# Patient Record
Sex: FEMALE | Race: WHITE | NOT HISPANIC OR LATINO | Employment: OTHER | URBAN - METROPOLITAN AREA
[De-identification: names, ages, dates, MRNs, and addresses within clinical notes are randomized per-mention and may not be internally consistent; named-entity substitution may affect disease eponyms.]

---

## 2017-05-09 ENCOUNTER — TRANSCRIBE ORDERS (OUTPATIENT)
Dept: ADMINISTRATIVE | Facility: HOSPITAL | Age: 67
End: 2017-05-09

## 2017-05-09 ENCOUNTER — APPOINTMENT (OUTPATIENT)
Dept: LAB | Facility: CLINIC | Age: 67
End: 2017-05-09
Payer: MEDICARE

## 2017-05-09 DIAGNOSIS — E03.9 UNSPECIFIED HYPOTHYROIDISM: Primary | ICD-10-CM

## 2017-05-09 DIAGNOSIS — E03.9 UNSPECIFIED HYPOTHYROIDISM: ICD-10-CM

## 2017-05-09 DIAGNOSIS — E06.3 CHRONIC LYMPHOCYTIC THYROIDITIS: ICD-10-CM

## 2017-05-09 LAB
T4 FREE SERPL-MCNC: 1.39 NG/DL (ref 0.76–1.46)
TSH SERPL DL<=0.05 MIU/L-ACNC: 0.45 UIU/ML (ref 0.36–3.74)

## 2017-05-09 PROCEDURE — 84439 ASSAY OF FREE THYROXINE: CPT

## 2017-05-09 PROCEDURE — 36415 COLL VENOUS BLD VENIPUNCTURE: CPT

## 2017-05-09 PROCEDURE — 84443 ASSAY THYROID STIM HORMONE: CPT

## 2017-05-26 ENCOUNTER — HOSPITAL ENCOUNTER (OUTPATIENT)
Dept: MAMMOGRAPHY | Facility: CLINIC | Age: 67
Discharge: HOME | End: 2017-05-26
Payer: MEDICARE

## 2017-05-26 DIAGNOSIS — Z12.31 ENCOUNTER FOR SCREENING MAMMOGRAM FOR MALIGNANT NEOPLASM OF BREAST: ICD-10-CM

## 2017-05-26 PROCEDURE — G0202 SCR MAMMO BI INCL CAD: HCPCS

## 2017-09-11 ENCOUNTER — TRANSCRIBE ORDERS (OUTPATIENT)
Dept: ADMINISTRATIVE | Facility: HOSPITAL | Age: 67
End: 2017-09-11

## 2017-09-11 ENCOUNTER — APPOINTMENT (OUTPATIENT)
Dept: LAB | Facility: CLINIC | Age: 67
End: 2017-09-11
Payer: MEDICARE

## 2017-09-11 DIAGNOSIS — L03.90 CELLULITIS OF SKIN WITH LYMPHANGITIS: ICD-10-CM

## 2017-09-11 DIAGNOSIS — L30.9 ACUTE DERMATITIS: Primary | ICD-10-CM

## 2017-09-11 DIAGNOSIS — L30.9 ACUTE DERMATITIS: ICD-10-CM

## 2017-09-11 LAB
BASOPHILS # BLD AUTO: 0.09 THOUSANDS/ΜL (ref 0–0.1)
BASOPHILS NFR BLD AUTO: 2 % (ref 0–1)
EOSINOPHIL # BLD AUTO: 0.27 THOUSAND/ΜL (ref 0–0.61)
EOSINOPHIL NFR BLD AUTO: 4 % (ref 0–6)
ERYTHROCYTE [DISTWIDTH] IN BLOOD BY AUTOMATED COUNT: 13.1 % (ref 11.6–15.1)
HCT VFR BLD AUTO: 35.4 % (ref 34.8–46.1)
HGB BLD-MCNC: 11.6 G/DL (ref 11.5–15.4)
LYMPHOCYTES # BLD AUTO: 1.87 THOUSANDS/ΜL (ref 0.6–4.47)
LYMPHOCYTES NFR BLD AUTO: 30 % (ref 14–44)
MCH RBC QN AUTO: 31.6 PG (ref 26.8–34.3)
MCHC RBC AUTO-ENTMCNC: 32.8 G/DL (ref 31.4–37.4)
MCV RBC AUTO: 97 FL (ref 82–98)
MONOCYTES # BLD AUTO: 0.63 THOUSAND/ΜL (ref 0.17–1.22)
MONOCYTES NFR BLD AUTO: 10 % (ref 4–12)
NEUTROPHILS # BLD AUTO: 3.31 THOUSANDS/ΜL (ref 1.85–7.62)
NEUTS SEG NFR BLD AUTO: 54 % (ref 43–75)
NRBC BLD AUTO-RTO: 0 /100 WBCS
PLATELET # BLD AUTO: 255 THOUSANDS/UL (ref 149–390)
PMV BLD AUTO: 10.6 FL (ref 8.9–12.7)
RBC # BLD AUTO: 3.67 MILLION/UL (ref 3.81–5.12)
WBC # BLD AUTO: 6.18 THOUSAND/UL (ref 4.31–10.16)

## 2017-09-11 PROCEDURE — 36415 COLL VENOUS BLD VENIPUNCTURE: CPT

## 2017-09-11 PROCEDURE — 86618 LYME DISEASE ANTIBODY: CPT

## 2017-09-11 PROCEDURE — 85025 COMPLETE CBC W/AUTO DIFF WBC: CPT

## 2017-09-13 LAB
B BURGDOR IGG SER IA-ACNC: 0.09
B BURGDOR IGM SER IA-ACNC: 0.15

## 2017-12-12 ENCOUNTER — TRANSCRIBE ORDERS (OUTPATIENT)
Dept: ADMINISTRATIVE | Facility: HOSPITAL | Age: 67
End: 2017-12-12

## 2017-12-12 ENCOUNTER — APPOINTMENT (OUTPATIENT)
Dept: LAB | Facility: CLINIC | Age: 67
End: 2017-12-12
Payer: MEDICARE

## 2017-12-12 DIAGNOSIS — Z00.00 ROUTINE GENERAL MEDICAL EXAMINATION AT A HEALTH CARE FACILITY: Primary | ICD-10-CM

## 2017-12-12 DIAGNOSIS — Z11.59 NEED FOR HEPATITIS C SCREENING TEST: ICD-10-CM

## 2017-12-12 DIAGNOSIS — Z00.00 ROUTINE GENERAL MEDICAL EXAMINATION AT A HEALTH CARE FACILITY: ICD-10-CM

## 2017-12-12 DIAGNOSIS — E78.5 HYPERLIPIDEMIA, UNSPECIFIED HYPERLIPIDEMIA TYPE: ICD-10-CM

## 2017-12-12 LAB
ALBUMIN SERPL BCP-MCNC: 3.5 G/DL (ref 3.5–5)
ALP SERPL-CCNC: 72 U/L (ref 46–116)
ALT SERPL W P-5'-P-CCNC: 19 U/L (ref 12–78)
ANION GAP SERPL CALCULATED.3IONS-SCNC: 5 MMOL/L (ref 4–13)
AST SERPL W P-5'-P-CCNC: 16 U/L (ref 5–45)
BILIRUB SERPL-MCNC: 0.87 MG/DL (ref 0.2–1)
BUN SERPL-MCNC: 19 MG/DL (ref 5–25)
CALCIUM SERPL-MCNC: 8.8 MG/DL (ref 8.3–10.1)
CHLORIDE SERPL-SCNC: 106 MMOL/L (ref 100–108)
CHOLEST SERPL-MCNC: 184 MG/DL (ref 50–200)
CO2 SERPL-SCNC: 27 MMOL/L (ref 21–32)
CREAT SERPL-MCNC: 0.91 MG/DL (ref 0.6–1.3)
GFR SERPL CREATININE-BSD FRML MDRD: 65 ML/MIN/1.73SQ M
GLUCOSE P FAST SERPL-MCNC: 86 MG/DL (ref 65–99)
HCV AB SER QL: NORMAL
HDLC SERPL-MCNC: 72 MG/DL (ref 40–60)
LDLC SERPL CALC-MCNC: 100 MG/DL (ref 0–100)
POTASSIUM SERPL-SCNC: 4.5 MMOL/L (ref 3.5–5.3)
PROT SERPL-MCNC: 7.8 G/DL (ref 6.4–8.2)
SODIUM SERPL-SCNC: 138 MMOL/L (ref 136–145)
TRIGL SERPL-MCNC: 62 MG/DL

## 2017-12-12 PROCEDURE — 36415 COLL VENOUS BLD VENIPUNCTURE: CPT

## 2017-12-12 PROCEDURE — 80053 COMPREHEN METABOLIC PANEL: CPT

## 2017-12-12 PROCEDURE — 86803 HEPATITIS C AB TEST: CPT

## 2017-12-12 PROCEDURE — 80061 LIPID PANEL: CPT

## 2018-01-15 NOTE — MISCELLANEOUS
Message   Recorded as Task   Date: 09/16/2016 02:31 PM, Created By: Peggy Swann   Task Name: Go to Result   Assigned To: Chang Hernández   Regarding Patient: Jay Jay Cooney, Status: Active   Comment:    Peggy Swann - 16 Sep 2016 2:31 PM     TASK CREATED  nml dexa        Active Problems    1  Encounter for screening mammogram for malignant neoplasm of breast (V76 12)   (Z12 31)   2  History of self breast exam   3  Menopausal state (627 2) (N95 1)   4  Visit for routine gyn exam (V72 31) (Z01 419)    Current Meds   1  Levothyroxine Sodium 100 MCG Oral Tablet; Therapy: 21Jun2016 to Recorded   2  Lisinopril 10 MG Oral Tablet; Therapy: [id number] to Recorded   3  Simvastatin 20 MG Oral Tablet; Therapy: 39SPU1055 to (Last Rx:04Apr2011)  Requested for: 04Apr2011 Ordered    Allergies    1  Erythromycin Base TABS   2   Penicillins    Signatures   Electronically signed by : Moraima Magallanes, ; Sep 19 2016  8:52AM EST                       (Author)

## 2018-04-26 DIAGNOSIS — E06.3 HYPOTHYROIDISM DUE TO HASHIMOTO'S THYROIDITIS: Primary | ICD-10-CM

## 2018-04-26 DIAGNOSIS — E03.8 HYPOTHYROIDISM DUE TO HASHIMOTO'S THYROIDITIS: Primary | ICD-10-CM

## 2018-04-26 RX ORDER — LEVOTHYROXINE SODIUM 0.1 MG/1
100 TABLET ORAL DAILY
Refills: 11 | COMMUNITY
Start: 2018-03-26 | End: 2018-04-26 | Stop reason: SDUPTHER

## 2018-04-26 RX ORDER — LEVOTHYROXINE SODIUM 0.1 MG/1
100 TABLET ORAL DAILY
Qty: 30 TABLET | Refills: 3 | Status: SHIPPED | OUTPATIENT
Start: 2018-04-26 | End: 2018-08-22 | Stop reason: DRUGHIGH

## 2018-05-17 ENCOUNTER — TRANSCRIBE ORDERS (OUTPATIENT)
Dept: ADMINISTRATIVE | Facility: HOSPITAL | Age: 68
End: 2018-05-17

## 2018-05-17 DIAGNOSIS — Z13.820 SCREENING FOR OSTEOPOROSIS: Primary | ICD-10-CM

## 2018-05-17 DIAGNOSIS — Z12.31 ENCOUNTER FOR SCREENING MAMMOGRAM FOR MALIGNANT NEOPLASM OF BREAST: Primary | ICD-10-CM

## 2018-05-29 ENCOUNTER — HOSPITAL ENCOUNTER (OUTPATIENT)
Dept: MAMMOGRAPHY | Facility: CLINIC | Age: 68
Discharge: HOME | End: 2018-05-29
Payer: MEDICARE

## 2018-05-29 DIAGNOSIS — Z12.31 ENCOUNTER FOR SCREENING MAMMOGRAM FOR MALIGNANT NEOPLASM OF BREAST: ICD-10-CM

## 2018-05-29 PROCEDURE — 77067 SCR MAMMO BI INCL CAD: CPT

## 2018-08-15 ENCOUNTER — APPOINTMENT (OUTPATIENT)
Dept: LAB | Facility: CLINIC | Age: 68
End: 2018-08-15
Payer: MEDICARE

## 2018-08-15 DIAGNOSIS — E06.3 HYPOTHYROIDISM DUE TO HASHIMOTO'S THYROIDITIS: ICD-10-CM

## 2018-08-15 DIAGNOSIS — E03.8 HYPOTHYROIDISM DUE TO HASHIMOTO'S THYROIDITIS: ICD-10-CM

## 2018-08-15 LAB
T4 FREE SERPL-MCNC: 1.52 NG/DL (ref 0.76–1.46)
TSH SERPL DL<=0.05 MIU/L-ACNC: 0.18 UIU/ML (ref 0.36–3.74)

## 2018-08-15 PROCEDURE — 36415 COLL VENOUS BLD VENIPUNCTURE: CPT

## 2018-08-15 PROCEDURE — 84443 ASSAY THYROID STIM HORMONE: CPT

## 2018-08-15 PROCEDURE — 84439 ASSAY OF FREE THYROXINE: CPT

## 2018-08-22 ENCOUNTER — OFFICE VISIT (OUTPATIENT)
Dept: ENDOCRINOLOGY | Facility: HOSPITAL | Age: 68
End: 2018-08-22
Payer: MEDICARE

## 2018-08-22 VITALS
HEART RATE: 70 BPM | WEIGHT: 195.8 LBS | SYSTOLIC BLOOD PRESSURE: 134 MMHG | DIASTOLIC BLOOD PRESSURE: 82 MMHG | HEIGHT: 66 IN | BODY MASS INDEX: 31.47 KG/M2

## 2018-08-22 DIAGNOSIS — E03.8 HYPOTHYROIDISM DUE TO HASHIMOTO'S THYROIDITIS: Primary | ICD-10-CM

## 2018-08-22 DIAGNOSIS — E06.3 HYPOTHYROIDISM DUE TO HASHIMOTO'S THYROIDITIS: Primary | ICD-10-CM

## 2018-08-22 PROBLEM — E03.9 HYPOTHYROID: Status: ACTIVE | Noted: 2018-08-22

## 2018-08-22 PROCEDURE — 99213 OFFICE O/P EST LOW 20 MIN: CPT | Performed by: INTERNAL MEDICINE

## 2018-08-22 RX ORDER — ALBUTEROL SULFATE 90 UG/1
AEROSOL, METERED RESPIRATORY (INHALATION)
COMMUNITY

## 2018-08-22 RX ORDER — MECLIZINE HCL 12.5 MG/1
TABLET ORAL
COMMUNITY
Start: 2018-03-26

## 2018-08-22 RX ORDER — NYSTATIN 100000 U/G
CREAM TOPICAL EVERY 12 HOURS
COMMUNITY
Start: 2017-11-14 | End: 2019-03-01

## 2018-08-22 RX ORDER — SIMVASTATIN 20 MG
20 TABLET ORAL
COMMUNITY

## 2018-08-22 RX ORDER — LISINOPRIL 10 MG/1
10 TABLET ORAL DAILY
COMMUNITY
Start: 2018-08-19 | End: 2020-08-24 | Stop reason: SINTOL

## 2018-08-22 RX ORDER — LEVOTHYROXINE SODIUM 88 UG/1
88 TABLET ORAL DAILY
Qty: 30 TABLET | Refills: 11 | Status: SHIPPED | OUTPATIENT
Start: 2018-08-22 | End: 2019-08-13 | Stop reason: SDUPTHER

## 2018-08-22 NOTE — PATIENT INSTRUCTIONS
Thyroid levels a little overactive now  Let's decrease the levothyroxine to 88 mcg daily  Recheck blood work in 3 months  Call 1 week afterwards for results  Follow up in 1 year with blood work

## 2018-08-22 NOTE — PROGRESS NOTES
8/22/2018    Assessment/Plan      Diagnoses and all orders for this visit:    Hypothyroidism due to Hashimoto's thyroiditis  -     TSH, 3rd generation Lab Collect; Future  -     T4, free Lab Collect; Future  -     TSH, 3rd generation Lab Collect; Future  -     T4, free Lab Collect; Future  -     levothyroxine 88 mcg tablet; Take 1 tablet (88 mcg total) by mouth daily    Other orders  -     lisinopril (ZESTRIL) 10 mg tablet; Take 10 mg by mouth daily    -     meclizine (ANTIVERT) 12 5 MG tablet; 1 tablet as needed  -     nystatin (MYCOSTATIN) cream; Every 12 hours  -     albuterol (PROAIR HFA) 90 mcg/act inhaler; 2 puffs as needed  -     simvastatin (ZOCOR) 20 mg tablet; Take 20 mg by mouth daily at bedtime          Assessment/Plan:  Hypothyroidism due to Hashimoto's thyroiditis  Most recent thyroid blood work does show a slightly low TSH and looking at her review over the last several years it has been decreasing slowly  Her free T4 is elevated also  This signifies biochemical hyperthyroidism  I will have her decrease her levothyroxine to 88 mcg daily  I have asked her to repeat her TSH and free T4 in 3 months and call within a week afterwards for the results so that I can adjust her medication  I have asked her to follow up in 1 year with preceding TSH and free T4       CC: thyroid consult    History of Present Illness     HPI: Noah Calhoun is a 77y o  year old female with history of hypothyroidism due to Hashimoto's thyroiditis  She was diagnosed with hypothyroidism over 20 years ago  She is currently taking levothyroxine 100 mcg daily  She denies heat or cold intolerance, tremors, insomnia, anxiety, depression, diarrhea, constipation, or weight changes  She will have a rare palpitation event  She will get tired at times but admits she has not been as active since she retired  She admits to some dry skin and hair loss, but no brittle nails  She has no diplopia    She has no compressive thyroid symptoms or dysphagia  Review of Systems   Constitutional: Positive for fatigue  Negative for unexpected weight change  Can be tired at times, less active since retired  HENT: Negative for hearing loss, tinnitus and trouble swallowing  Eyes: Negative for visual disturbance  No diplopia  Wears glasses that are bifocals  Respiratory: Negative for chest tightness and shortness of breath  Cardiovascular: Positive for palpitations  Negative for chest pain and leg swelling  Had a few palpitations when missed thyroid doses on 1 weekend  Gastrointestinal: Negative for abdominal pain, constipation, diarrhea and nausea  Endocrine: Negative for cold intolerance and heat intolerance  Musculoskeletal: Positive for arthralgias  Negative for back pain  Skin: Negative for rash  Has dry skin and some hair loss, but no brittle nails  Neurological: Negative for dizziness, tremors, light-headedness, numbness and headaches  History of vertigo in the past at times  Psychiatric/Behavioral: Negative for dysphoric mood and sleep disturbance  The patient is not nervous/anxious  Occasional anxiety  Occasional bad nights         Historical Information   Past Medical History:   Diagnosis Date    Asthma     [facility]     Breast cyst     Gout     Hyperlipidemia     Hypertension     Osteoarthritis     [facility]     Vertigo     at times     Past Surgical History:   Procedure Laterality Date    CARDIAC ELECTROPHYSIOLOGY STUDY AND ABLATION      CYST REMOVAL Bilateral     aspiration    HYSTERECTOMY      REPLACEMENT TOTAL KNEE BILATERAL Bilateral     TONSILLECTOMY AND ADENOIDECTOMY       Social History   History   Alcohol Use No     History   Drug Use No     History   Smoking Status    Never Smoker   Smokeless Tobacco    Never Used     Family History:   Family History   Problem Relation Age of Onset    Alzheimer's disease Mother  Hypertension Father     Heart attack Father     Liver disease Brother         on O2    No Known Problems Daughter     Obesity Son         post gastric bypass       Meds/Allergies   Current Outpatient Prescriptions   Medication Sig Dispense Refill    albuterol (PROAIR HFA) 90 mcg/act inhaler 2 puffs as needed      lisinopril (ZESTRIL) 10 mg tablet Take 10 mg by mouth daily        simvastatin (ZOCOR) 20 mg tablet Take 20 mg by mouth daily at bedtime        levothyroxine 88 mcg tablet Take 1 tablet (88 mcg total) by mouth daily 30 tablet 11    meclizine (ANTIVERT) 12 5 MG tablet 1 tablet as needed      nystatin (MYCOSTATIN) cream Every 12 hours       No current facility-administered medications for this visit  Allergies   Allergen Reactions    Penicillin G      Other reaction(s): anaphylaxis    Erythromycin      Other reaction(s): n/v       Objective   Vitals: Blood pressure 134/82, pulse 70, height 5' 6" (1 676 m), weight 88 8 kg (195 lb 12 8 oz)  Invasive Devices          No matching active lines, drains, or airways          Physical Exam   Constitutional: She is oriented to person, place, and time  She appears well-developed and well-nourished  HENT:   Head: Normocephalic and atraumatic  Eyes: Conjunctivae and EOM are normal  Pupils are equal, round, and reactive to light  No lid lag, stare, proptosis, or periorbital edema  Neck: Normal range of motion  Neck supple  No thyromegaly present  No carotid bruits  Cardiovascular: Normal rate, regular rhythm, normal heart sounds and intact distal pulses  No murmur heard  Pulmonary/Chest: Effort normal and breath sounds normal  She has no wheezes  Abdominal: Soft  Bowel sounds are normal  There is no tenderness  Musculoskeletal: Normal range of motion  She exhibits no edema or deformity  No tremor of the outstretched hands  No spinous process tenderness  No CVA tenderness  Lymphadenopathy:     She has no cervical adenopathy  Neurological: She is alert and oriented to person, place, and time  She has normal reflexes  Skin: Skin is warm and dry  No rash noted  Vitals reviewed  The history was obtained from the review of the chart and from the patient  Lab Results:    Most recent blood workdone on 08/15/2018 showed a TSH of 0 183 with a free T4 of 1 52        Future Appointments  Date Time Provider Robson Mcintyre   9/17/2018 7:30 AM QU MAMMO UP 1 QU UP Mammo QU UPPER PER   9/24/2018 9:15 AM DO HIRAM Roque-W[facility]   8/22/2019 8:00 AM Lina Parker MD ENDO [facility]

## 2018-08-22 NOTE — LETTER
August 22, 2018     Shayla Felipe, [address] [facility]  [address] [address]    Patient: Katie Jean   YOB: 1950   Date of Visit: 8/22/2018       Dear Dr Jesse Simms: Thank you for referring Jayro Mehta to me for evaluation  Below are my notes for this consultation  If you have questions, please do not hesitate to call me  I look forward to following your patient along with you  Sincerely,        Pat Baxter MD        CC: No Recipients  Pat Baxter MD  8/22/2018 11:27 AM  Sign at close encounter  8/22/2018    Assessment/Plan      Diagnoses and all orders for this visit:    Hypothyroidism due to Hashimoto's thyroiditis  -     TSH, 3rd generation Lab Collect; Future  -     T4, free Lab Collect; Future  -     TSH, 3rd generation Lab Collect; Future  -     T4, free Lab Collect; Future  -     levothyroxine 88 mcg tablet; Take 1 tablet (88 mcg total) by mouth daily    Other orders  -     lisinopril (ZESTRIL) 10 mg tablet; Take 10 mg by mouth daily    -     meclizine (ANTIVERT) 12 5 MG tablet; 1 tablet as needed  -     nystatin (MYCOSTATIN) cream; Every 12 hours  -     albuterol (PROAIR HFA) 90 mcg/act inhaler; 2 puffs as needed  -     simvastatin (ZOCOR) 20 mg tablet; Take 20 mg by mouth daily at bedtime          Assessment/Plan:  Hypothyroidism due to Hashimoto's thyroiditis  Most recent thyroid blood work does show a slightly low TSH and looking at her review over the last several years it has been decreasing slowly  Her free T4 is elevated also  This signifies biochemical hyperthyroidism  I will have her decrease her levothyroxine to 88 mcg daily  I have asked her to repeat her TSH and free T4 in 3 months and call within a week afterwards for the results so that I can adjust her medication    I have asked her to follow up in 1 year with preceding TSH and free T4       CC: thyroid consult    History of Present Illness     HPI: Katie Jean is a 77y o  year old female with history of hypothyroidism due to Hashimoto's thyroiditis  She was diagnosed with hypothyroidism over 20 years ago  She is currently taking levothyroxine 100 mcg daily  She denies heat or cold intolerance, tremors, insomnia, anxiety, depression, diarrhea, constipation, or weight changes  She will have a rare palpitation event  She will get tired at times but admits she has not been as active since she retired  She admits to some dry skin and hair loss, but no brittle nails  She has no diplopia  She has no compressive thyroid symptoms or dysphagia  Review of Systems   Constitutional: Positive for fatigue  Negative for unexpected weight change  Can be tired at times, less active since retired  HENT: Negative for hearing loss, tinnitus and trouble swallowing  Eyes: Negative for visual disturbance  No diplopia  Wears glasses that are bifocals  Respiratory: Negative for chest tightness and shortness of breath  Cardiovascular: Positive for palpitations  Negative for chest pain and leg swelling  Had a few palpitations when missed thyroid doses on 1 weekend  Gastrointestinal: Negative for abdominal pain, constipation, diarrhea and nausea  Endocrine: Negative for cold intolerance and heat intolerance  Musculoskeletal: Positive for arthralgias  Negative for back pain  Skin: Negative for rash  Has dry skin and some hair loss, but no brittle nails  Neurological: Negative for dizziness, tremors, light-headedness, numbness and headaches  History of vertigo in the past at times  Psychiatric/Behavioral: Negative for dysphoric mood and sleep disturbance  The patient is not nervous/anxious  Occasional anxiety  Occasional bad nights         Historical Information   Past Medical History:   Diagnosis Date    Asthma     Atrial fibrillation ([address]     Breast cyst     Gout     Hyperlipidemia     Hypertension     Osteoarthritis     [facility]     Vertigo     at times     Past Surgical History:   Procedure Laterality Date    CARDIAC ELECTROPHYSIOLOGY STUDY AND ABLATION      CYST REMOVAL Bilateral     aspiration    HYSTERECTOMY      REPLACEMENT TOTAL KNEE BILATERAL Bilateral     TONSILLECTOMY AND ADENOIDECTOMY       Social History   History   Alcohol Use No     History   Drug Use No     History   Smoking Status    Never Smoker   Smokeless Tobacco    Never Used     Family History:   Family History   Problem Relation Age of Onset    Alzheimer's disease Mother     Hypertension Father     Heart attack Father     Liver disease Brother         on O2    No Known Problems Daughter     Obesity Son         post gastric bypass       Meds/Allergies   Current Outpatient Prescriptions   Medication Sig Dispense Refill    albuterol (PROAIR HFA) 90 mcg/act inhaler 2 puffs as needed      lisinopril (ZESTRIL) 10 mg tablet Take 10 mg by mouth daily        simvastatin (ZOCOR) 20 mg tablet Take 20 mg by mouth daily at bedtime        levothyroxine 88 mcg tablet Take 1 tablet (88 mcg total) by mouth daily 30 tablet 11    meclizine (ANTIVERT) 12 5 MG tablet 1 tablet as needed      nystatin (MYCOSTATIN) cream Every 12 hours       No current facility-administered medications for this visit  Allergies   Allergen Reactions    Penicillin G      Other reaction(s): anaphylaxis    Erythromycin      Other reaction(s): n/v       Objective   Vitals: Blood pressure 134/82, pulse 70, height 5' 6" (1 676 m), weight 88 8 kg (195 lb 12 8 oz)  Invasive Devices          No matching active lines, drains, or airways          Physical Exam   Constitutional: She is oriented to person, place, and time  She appears well-developed and well-nourished  HENT:   Head: Normocephalic and atraumatic  Eyes: Conjunctivae and EOM are normal  Pupils are equal, round, and reactive to light  No lid lag, stare, proptosis, or periorbital edema     Neck: Normal range of motion  Neck supple  No thyromegaly present  No carotid bruits  Cardiovascular: Normal rate, regular rhythm, normal heart sounds and intact distal pulses  No murmur heard  Pulmonary/Chest: Effort normal and breath sounds normal  She has no wheezes  Abdominal: Soft  Bowel sounds are normal  There is no tenderness  Musculoskeletal: Normal range of motion  She exhibits no edema or deformity  No tremor of the outstretched hands  No spinous process tenderness  No CVA tenderness  Lymphadenopathy:     She has no cervical adenopathy  Neurological: She is alert and oriented to person, place, and time  She has normal reflexes  Skin: Skin is warm and dry  No rash noted  Vitals reviewed  The history was obtained from the review of the chart and from the patient  Lab Results:    Most recent blood workdone on 08/15/2018 showed a TSH of 0 183 with a free T4 of 1 52        Future Appointments  Date Time Provider Robson Mcintyre   9/17/2018 7:30 AM QU MAMMO UP 1 QU UP Mammo QU UPPER PER   9/24/2018 9:15 AM DO HIRAM [facility]   8/22/2019 8:00 AM Román Rodríguez MD ENDO [facility]

## 2018-09-17 ENCOUNTER — TELEPHONE (OUTPATIENT)
Dept: OBGYN CLINIC | Facility: CLINIC | Age: 68
End: 2018-09-17

## 2018-09-17 ENCOUNTER — HOSPITAL ENCOUNTER (OUTPATIENT)
Dept: MAMMOGRAPHY | Facility: CLINIC | Age: 68
Discharge: HOME | End: 2018-09-17
Payer: MEDICARE

## 2018-09-17 DIAGNOSIS — Z78.0 POSTMENOPAUSAL: ICD-10-CM

## 2018-09-17 DIAGNOSIS — Z13.820 SCREENING FOR OSTEOPOROSIS: ICD-10-CM

## 2018-09-17 PROCEDURE — 77080 DXA BONE DENSITY AXIAL: CPT

## 2018-09-24 ENCOUNTER — ANNUAL EXAM (OUTPATIENT)
Dept: OBGYN CLINIC | Facility: CLINIC | Age: 68
End: 2018-09-24
Payer: MEDICARE

## 2018-09-24 VITALS
SYSTOLIC BLOOD PRESSURE: 120 MMHG | HEIGHT: 65 IN | WEIGHT: 194.2 LBS | BODY MASS INDEX: 32.36 KG/M2 | DIASTOLIC BLOOD PRESSURE: 78 MMHG

## 2018-09-24 DIAGNOSIS — Z01.419 ENCOUNTER FOR ANNUAL ROUTINE GYNECOLOGICAL EXAMINATION: Primary | ICD-10-CM

## 2018-09-24 DIAGNOSIS — Z12.31 ENCOUNTER FOR SCREENING MAMMOGRAM FOR BREAST CANCER: ICD-10-CM

## 2018-09-24 PROCEDURE — G0101 CA SCREEN;PELVIC/BREAST EXAM: HCPCS | Performed by: OBSTETRICS & GYNECOLOGY

## 2018-09-24 NOTE — PROGRESS NOTES
Assessment/Plan:    DEXA was normal, reviewed adequate Ca, vit D and weight bearing exercise    Reviewed mammogram, Rx given for next May, discussed self breast exams    Colonoscopy is up to date, she goes every 5 years      No problem-specific Assessment & Plan notes found for this encounter  Diagnoses and all orders for this visit:    Encounter for annual routine gynecological examination    Encounter for screening mammogram for breast cancer  -     Mammo screening bilateral w cad; Future          Subjective:      Patient ID: Marii Jung is a 77 y o  female  Patient here for yearly  She has no gyn complaints  She just had a normal DEXA and in May she had a normal mammogram   She is quite active helping to take care of her grandson  She has some questions about calcium and vitamin-D  She stop taking calcium a few years ago  The following portions of the patient's history were reviewed and updated as appropriate: allergies, current medications, past family history, past medical history, past social history, past surgical history and problem list     Review of Systems   Constitutional: Negative  HENT: Negative  Eyes: Negative  Respiratory: Negative  Cardiovascular: Negative  Gastrointestinal: Negative  Endocrine: Negative  Genitourinary: Negative  Musculoskeletal: Negative  Skin: Negative  Allergic/Immunologic: Negative  Neurological: Negative  Hematological: Negative  Psychiatric/Behavioral: Negative  Objective:      /78 (BP Location: Left arm, Patient Position: Sitting, Cuff Size: Standard)   Ht 5' 5" (1 651 m)   Wt 88 1 kg (194 lb 3 2 oz)   BMI 32 32 kg/m²          Physical Exam   Constitutional: She appears well-developed  Neck: No tracheal deviation present  No thyromegaly present  Cardiovascular: Normal rate and regular rhythm      Pulmonary/Chest: Effort normal and breath sounds normal  Right breast exhibits no inverted nipple, no mass, no nipple discharge, no skin change and no tenderness  Left breast exhibits no inverted nipple, no mass, no nipple discharge, no skin change and no tenderness  Breasts are symmetrical    Examined seated and supine   Abdominal: Soft  She exhibits no distension and no mass  There is no tenderness  Genitourinary: Rectum normal and vagina normal  No labial fusion  There is no rash, tenderness, lesion or injury on the right labia  There is no rash, tenderness, lesion or injury on the left labia  Genitourinary Comments: Uterus, cervix and adnexa are surgically absent  Vitals reviewed

## 2018-12-11 ENCOUNTER — APPOINTMENT (OUTPATIENT)
Dept: LAB | Facility: CLINIC | Age: 68
End: 2018-12-11
Payer: MEDICARE

## 2018-12-11 ENCOUNTER — TRANSCRIBE ORDERS (OUTPATIENT)
Dept: ADMINISTRATIVE | Facility: HOSPITAL | Age: 68
End: 2018-12-11

## 2018-12-11 DIAGNOSIS — E03.9 HYPOTHYROIDISM, UNSPECIFIED TYPE: ICD-10-CM

## 2018-12-11 DIAGNOSIS — Z23 NEED FOR PROPHYLACTIC VACCINATION AND INOCULATION AGAINST CHOLERA ALONE: ICD-10-CM

## 2018-12-11 DIAGNOSIS — E03.8 HYPOTHYROIDISM DUE TO HASHIMOTO'S THYROIDITIS: ICD-10-CM

## 2018-12-11 DIAGNOSIS — R63.8 EFFECTS OF THIRST: ICD-10-CM

## 2018-12-11 DIAGNOSIS — I10 HYPERTENSION, ESSENTIAL: ICD-10-CM

## 2018-12-11 DIAGNOSIS — E06.3 HYPOTHYROIDISM DUE TO HASHIMOTO'S THYROIDITIS: ICD-10-CM

## 2018-12-11 DIAGNOSIS — Z23 NEED FOR PROPHYLACTIC VACCINATION AND INOCULATION AGAINST CHOLERA ALONE: Primary | ICD-10-CM

## 2018-12-11 LAB
ALBUMIN SERPL BCP-MCNC: 3.6 G/DL (ref 3.5–5)
ALP SERPL-CCNC: 72 U/L (ref 46–116)
ALT SERPL W P-5'-P-CCNC: 18 U/L (ref 12–78)
ANION GAP SERPL CALCULATED.3IONS-SCNC: 5 MMOL/L (ref 4–13)
AST SERPL W P-5'-P-CCNC: 16 U/L (ref 5–45)
BILIRUB SERPL-MCNC: 0.85 MG/DL (ref 0.2–1)
BUN SERPL-MCNC: 21 MG/DL (ref 5–25)
CALCIUM SERPL-MCNC: 9.4 MG/DL (ref 8.3–10.1)
CHLORIDE SERPL-SCNC: 105 MMOL/L (ref 100–108)
CHOLEST SERPL-MCNC: 177 MG/DL (ref 50–200)
CO2 SERPL-SCNC: 26 MMOL/L (ref 21–32)
CREAT SERPL-MCNC: 1.08 MG/DL (ref 0.6–1.3)
GFR SERPL CREATININE-BSD FRML MDRD: 52 ML/MIN/1.73SQ M
GLUCOSE P FAST SERPL-MCNC: 88 MG/DL (ref 65–99)
HDLC SERPL-MCNC: 69 MG/DL (ref 40–60)
LDLC SERPL CALC-MCNC: 94 MG/DL (ref 0–100)
NONHDLC SERPL-MCNC: 108 MG/DL
POTASSIUM SERPL-SCNC: 4.8 MMOL/L (ref 3.5–5.3)
PROT SERPL-MCNC: 7.8 G/DL (ref 6.4–8.2)
SODIUM SERPL-SCNC: 136 MMOL/L (ref 136–145)
T4 FREE SERPL-MCNC: 1.32 NG/DL (ref 0.76–1.46)
TRIGL SERPL-MCNC: 68 MG/DL
TSH SERPL DL<=0.05 MIU/L-ACNC: 2.2 UIU/ML (ref 0.36–3.74)

## 2018-12-11 PROCEDURE — 80061 LIPID PANEL: CPT

## 2018-12-11 PROCEDURE — 36415 COLL VENOUS BLD VENIPUNCTURE: CPT

## 2018-12-11 PROCEDURE — 84439 ASSAY OF FREE THYROXINE: CPT

## 2018-12-11 PROCEDURE — 80053 COMPREHEN METABOLIC PANEL: CPT

## 2018-12-11 PROCEDURE — 84443 ASSAY THYROID STIM HORMONE: CPT

## 2019-03-01 ENCOUNTER — HOSPITAL ENCOUNTER (EMERGENCY)
Facility: HOSPITAL | Age: 69
Discharge: HOME | End: 2019-03-02
Attending: EMERGENCY MEDICINE | Admitting: EMERGENCY MEDICINE
Payer: MEDICARE

## 2019-03-01 ENCOUNTER — APPOINTMENT (EMERGENCY)
Dept: RADIOLOGY | Facility: HOSPITAL | Age: 69
End: 2019-03-01
Payer: MEDICARE

## 2019-03-01 DIAGNOSIS — R07.9 CHEST PAIN: Primary | ICD-10-CM

## 2019-03-01 LAB
ANION GAP SERPL CALCULATED.3IONS-SCNC: 12 MMOL/L (ref 4–13)
APTT PPP: 31 SECONDS (ref 26–38)
BASOPHILS # BLD AUTO: 0.05 THOUSANDS/ΜL (ref 0–0.1)
BASOPHILS NFR BLD AUTO: 1 % (ref 0–1)
BUN SERPL-MCNC: 16 MG/DL (ref 5–25)
CALCIUM SERPL-MCNC: 8.8 MG/DL (ref 8.3–10.1)
CHLORIDE SERPL-SCNC: 101 MMOL/L (ref 100–108)
CO2 SERPL-SCNC: 25 MMOL/L (ref 21–32)
CREAT SERPL-MCNC: 1.16 MG/DL (ref 0.6–1.3)
EOSINOPHIL # BLD AUTO: 0.17 THOUSAND/ΜL (ref 0–0.61)
EOSINOPHIL NFR BLD AUTO: 2 % (ref 0–6)
ERYTHROCYTE [DISTWIDTH] IN BLOOD BY AUTOMATED COUNT: 12.5 % (ref 11.6–15.1)
GFR SERPL CREATININE-BSD FRML MDRD: 47 ML/MIN/1.73SQ M
GLUCOSE SERPL-MCNC: 117 MG/DL (ref 65–140)
HCT VFR BLD AUTO: 36.9 % (ref 34.8–46.1)
HGB BLD-MCNC: 12 G/DL (ref 11.5–15.4)
IMM GRANULOCYTES # BLD AUTO: 0.03 THOUSAND/UL (ref 0–0.2)
IMM GRANULOCYTES NFR BLD AUTO: 0 % (ref 0–2)
INR PPP: 1.03 (ref 0.86–1.17)
LYMPHOCYTES # BLD AUTO: 1.47 THOUSANDS/ΜL (ref 0.6–4.47)
LYMPHOCYTES NFR BLD AUTO: 20 % (ref 14–44)
MCH RBC QN AUTO: 32 PG (ref 26.8–34.3)
MCHC RBC AUTO-ENTMCNC: 32.5 G/DL (ref 31.4–37.4)
MCV RBC AUTO: 98 FL (ref 82–98)
MONOCYTES # BLD AUTO: 0.63 THOUSAND/ΜL (ref 0.17–1.22)
MONOCYTES NFR BLD AUTO: 9 % (ref 4–12)
NEUTROPHILS # BLD AUTO: 4.98 THOUSANDS/ΜL (ref 1.85–7.62)
NEUTS SEG NFR BLD AUTO: 68 % (ref 43–75)
NRBC BLD AUTO-RTO: 0 /100 WBCS
PLATELET # BLD AUTO: 237 THOUSANDS/UL (ref 149–390)
PMV BLD AUTO: 10.2 FL (ref 8.9–12.7)
POTASSIUM SERPL-SCNC: 3.3 MMOL/L (ref 3.5–5.3)
PROTHROMBIN TIME: 12.9 SECONDS (ref 11.8–14.2)
RBC # BLD AUTO: 3.75 MILLION/UL (ref 3.81–5.12)
SODIUM SERPL-SCNC: 138 MMOL/L (ref 136–145)
TROPONIN I SERPL-MCNC: <0.02 NG/ML
TROPONIN I SERPL-MCNC: <0.02 NG/ML
WBC # BLD AUTO: 7.33 THOUSAND/UL (ref 4.31–10.16)

## 2019-03-01 PROCEDURE — 93005 ELECTROCARDIOGRAM TRACING: CPT

## 2019-03-01 PROCEDURE — 80048 BASIC METABOLIC PNL TOTAL CA: CPT | Performed by: EMERGENCY MEDICINE

## 2019-03-01 PROCEDURE — 85025 COMPLETE CBC W/AUTO DIFF WBC: CPT | Performed by: EMERGENCY MEDICINE

## 2019-03-01 PROCEDURE — 99285 EMERGENCY DEPT VISIT HI MDM: CPT

## 2019-03-01 PROCEDURE — 84484 ASSAY OF TROPONIN QUANT: CPT | Performed by: EMERGENCY MEDICINE

## 2019-03-01 PROCEDURE — 71046 X-RAY EXAM CHEST 2 VIEWS: CPT

## 2019-03-01 PROCEDURE — 85610 PROTHROMBIN TIME: CPT | Performed by: EMERGENCY MEDICINE

## 2019-03-01 PROCEDURE — 85730 THROMBOPLASTIN TIME PARTIAL: CPT | Performed by: EMERGENCY MEDICINE

## 2019-03-01 PROCEDURE — 36415 COLL VENOUS BLD VENIPUNCTURE: CPT | Performed by: EMERGENCY MEDICINE

## 2019-03-01 RX ORDER — ASPIRIN 325 MG
325 TABLET ORAL ONCE
Status: COMPLETED | OUTPATIENT
Start: 2019-03-01 | End: 2019-03-01

## 2019-03-01 RX ADMIN — ASPIRIN 325 MG ORAL TABLET 325 MG: 325 PILL ORAL at 19:13

## 2019-03-01 NOTE — ED PROVIDER NOTES
History  Chief Complaint   Patient presents with    Chest Pain     Chest pressure and some shortness of breath; denies nausea/shortness of breath  This 75-year-old female with history of well-controlled asthma, hypertension, hyperlipidemia and remote history of atrial fibrillation status post ablation complains of chest tightness typical of her asthma attacks today  This started at 3:30 p m  while at rest  She states she is under stress due to 's health problems and other issues  She removed a small amount of snow from her car this morning with no complaints  She does not believe she was exposed any pulmonary irritants today  This tightness has been constant since 3:30 pm   She had only minimal relief with 1 albuterol inhaler treatment  Patient has no other pain but has felt some palpitations last night and today, described as a little pause in the pulse occasionally  Patient denies diaphoresis, nausea, syncope, hemoptysis, pain or swelling of extremity, recent surgery, immobility or long distance travel  There is no history of thrombophilia or coronary artery disease  Prior to Admission Medications   Prescriptions Last Dose Informant Patient Reported? Taking?    albuterol (PROAIR HFA) 90 mcg/act inhaler 3/1/2019 at Unknown time Self Yes Yes   Si puffs as needed   levothyroxine 88 mcg tablet 3/1/2019 at Unknown time  No Yes   Sig: Take 1 tablet (88 mcg total) by mouth daily   lisinopril (ZESTRIL) 10 mg tablet 3/1/2019 at Unknown time Self Yes Yes   Sig: Take 10 mg by mouth daily     meclizine (ANTIVERT) 12 5 MG tablet  Self Yes No   Si tablet as needed   simvastatin (ZOCOR) 20 mg tablet 2019 at Unknown time Self Yes Yes   Sig: Take 20 mg by mouth daily at bedtime        Facility-Administered Medications: None       Past Medical History:   Diagnosis Date    Asthma     Atrial fibrillation (HCC)     Breast cyst     Disease of thyroid gland     Endometriosis     Fibroid     Gout     Hyperlipidemia     Hypertension     Osteoarthritis     Silent myocardial infarction ([address] )     Vertigo     at times       Past Surgical History:   Procedure Laterality Date    CARDIAC ELECTROPHYSIOLOGY STUDY AND ABLATION      CYST REMOVAL Bilateral     aspiration    HYSTERECTOMY      REPLACEMENT TOTAL KNEE BILATERAL Bilateral     TONSILLECTOMY AND ADENOIDECTOMY         Family History   Problem Relation Age of Onset    Alzheimer's disease Mother     Hypertension Father     Heart attack Father     Liver disease Brother         on O2    No Known Problems Daughter     Obesity Son         post gastric bypass     I have reviewed and agree with the history as documented  Social History     Tobacco Use    Smoking status: Never Smoker    Smokeless tobacco: Never Used   Substance Use Topics    Alcohol use: No    Drug use: No        Review of Systems   Constitutional: Negative  HENT: Negative  Eyes: Negative  Respiratory: Positive for cough and chest tightness (Tonight, similar to prior asthma)  Stridor: nonproductive  Cardiovascular: Negative  Negative for chest pain and leg swelling  Gastrointestinal: Negative  Endocrine: Negative  Genitourinary: Negative  Musculoskeletal: Negative  Skin: Negative  Allergic/Immunologic: Negative  Neurological: Negative  Hematological: Negative  Psychiatric/Behavioral: Negative  All other systems reviewed and are negative  Physical Exam  Physical Exam   Constitutional: She is oriented to person, place, and time  She appears well-developed and well-nourished  Non-toxic appearance  She does not appear ill  No distress  HENT:   Head: Normocephalic and atraumatic  Eyes: Pupils are equal, round, and reactive to light  Conjunctivae and EOM are normal    Neck: Normal range of motion  Neck supple  Cardiovascular: Normal rate, regular rhythm and normal pulses  No murmur heard    Pulmonary/Chest: Effort normal and breath sounds normal  No stridor  No respiratory distress  Abdominal: Soft  Bowel sounds are normal  She exhibits no distension  There is no tenderness  Musculoskeletal: Normal range of motion  She exhibits no edema  Right lower leg: Normal         Left lower leg: Normal    Neurological: She is alert and oriented to person, place, and time  She has normal reflexes  No cranial nerve deficit  Coordination normal    Skin: Skin is warm and dry  Capillary refill takes less than 2 seconds  No rash noted  Psychiatric: She has a normal mood and affect  Nursing note and vitals reviewed        Vital Signs  ED Triage Vitals   Temperature Pulse Respirations Blood Pressure SpO2   03/01/19 1844 03/01/19 1844 03/01/19 1844 03/01/19 1847 03/01/19 1844   98 7 °F (37 1 °C) 93 16 152/74 98 %      Temp Source Heart Rate Source Patient Position - Orthostatic VS BP Location FiO2 (%)   03/01/19 1844 03/01/19 1844 -- -- --   Temporal Monitor         Pain Score       03/01/19 1844       4           Vitals:    03/01/19 2145 03/01/19 2315 03/01/19 2330 03/01/19 2345   BP: 120/57 107/60 110/70 119/59   Pulse: 81 89 98 83       Visual Acuity      ED Medications  Medications   aspirin tablet 325 mg (325 mg Oral Given 3/1/19 1913)       Diagnostic Studies  Results Reviewed     Procedure Component Value Units Date/Time    Troponin I [[id number]]  (Normal) Collected:  03/01/19 2158    Lab Status:  Final result Specimen:  Blood from Arm, Right Updated:  03/01/19 2226     Troponin I <0 02 ng/mL     Troponin I [[id number]]  (Normal) Collected:  03/01/19 1909    Lab Status:  Final result Specimen:  Blood from Arm, Right Updated:  03/01/19 1943     Troponin I <0 02 ng/mL     Protime-INR [[id number]]  (Normal) Collected:  03/01/19 1909    Lab Status:  Final result Specimen:  Blood from Arm, Right Updated:  03/01/19 1935     Protime 12 9 seconds      INR 1 03    APTT [[id number]]  (Normal) Collected:  03/01/19 1909    Lab Status:  Final result Specimen:  Blood from Arm, Right Updated:  03/01/19 1935     PTT 31 seconds     Basic metabolic panel [[id number]]  (Abnormal) Collected:  03/01/19 1909    Lab Status:  Final result Specimen:  Blood from Arm, Right Updated:  03/01/19 1933     Sodium 138 mmol/L      Potassium 3 3 mmol/L      Chloride 101 mmol/L      CO2 25 mmol/L      ANION GAP 12 mmol/L      BUN 16 mg/dL      Creatinine 1 16 mg/dL      Glucose 117 mg/dL      Calcium 8 8 mg/dL      eGFR 47 ml/min/1 73sq m     Narrative:       National Kidney Disease Education Program recommendations are as follows:  GFR calculation is accurate only with a steady state creatinine  Chronic Kidney disease less than 60 ml/min/1 73 sq  meters  Kidney failure less than 15 ml/min/1 73 sq  meters  CBC and differential [[id number]]  (Abnormal) Collected:  03/01/19 1909    Lab Status:  Final result Specimen:  Blood from Arm, Right Updated:  03/01/19 1923     WBC 7 33 Thousand/uL      RBC 3 75 Million/uL      Hemoglobin 12 0 g/dL      Hematocrit 36 9 %      MCV 98 fL      MCH 32 0 pg      MCHC 32 5 g/dL      RDW 12 5 %      MPV 10 2 fL      Platelets 984 Thousands/uL      nRBC 0 /100 WBCs      Neutrophils Relative 68 %      Immat GRANS % 0 %      Lymphocytes Relative 20 %      Monocytes Relative 9 %      Eosinophils Relative 2 %      Basophils Relative 1 %      Neutrophils Absolute 4 98 Thousands/µL      Immature Grans Absolute 0 03 Thousand/uL      Lymphocytes Absolute 1 47 Thousands/µL      Monocytes Absolute 0 63 Thousand/µL      Eosinophils Absolute 0 17 Thousand/µL      Basophils Absolute 0 05 Thousands/µL                  XR chest 2 views   ED Interpretation by Court Lu DO (03/01 2027)   No acute infiltrate effusion or pneumothorax      Final Result by Inez Hogan MD (03/02 7892)      Hyperinflation  No acute pulmonary disease              Workstation performed: [id number]                    Procedures  ECG 12 Lead Documentation  Date/Time: 3/1/2019 6:47 PM  Performed by: Holly Hernandez DO  Authorized by: Holly Hernandez DO     ECG reviewed by me, the ED Provider: yes    Patient location:  ED  Previous ECG:     Previous ECG:  Unavailable  Rhythm:     Rhythm: sinus rhythm    Ectopy:     Ectopy comment:  Sinus arrhythmia  QRS:     QRS axis:  Left    QRS intervals:  Normal  Conduction:     Conduction: abnormal      Abnormal conduction: incomplete RBBB    Other findings:     Other findings: poor R wave progression             Phone Contacts  ED Phone Contact    ED Course  ED Course as of Mar 02 1023   Fri Mar 01, 2019   2110 At 9:00 p m  Patient states that she is asymptomatic  She coughs a little bit and moved some mucus and feels better  She believes this is asthma attack  She is agreeable to staying in ED until 10:00 p m  For delta troponins and repeat EKG  2152 Patient asymptomatic and hemodynamically stable  Signed out to Dr Juana Conner at 10:00 p m  Karrie Nissen   Delta troponin and repeat EKG pending            HEART Risk Score      Most Recent Value   History  0 Filed at: 03/01/2019 2151   ECG  0 Filed at: 03/01/2019 2151   Age  2 Filed at: 03/01/2019 2151   Risk Factors  1 Filed at: 03/01/2019 2151   Troponin  0 Filed at: 03/01/2019 2151   Heart Score Risk Calculator   History  0 Filed at: 03/01/2019 2151   ECG  0 Filed at: 03/01/2019 2151   Age  2 Filed at: 03/01/2019 2151   Risk Factors  1 Filed at: 03/01/2019 2151   Troponin  0 Filed at: 03/01/2019 2151   HEART Score  3 Filed at: 03/01/2019 2151   HEART Score  3 Filed at: 03/01/2019 2151                            MDM    Disposition  Final diagnoses:   Chest pain     Time reflects when diagnosis was documented in both MDM as applicable and the Disposition within this note     Time User Action Codes Description Comment    3/1/2019 11:51 PM Celio Montez Add [R07 9] Chest pain       ED Disposition     ED Disposition Condition Date/Time Comment    Discharge Stable Fri Mar 1, 2019 11:52 PM Jayden Hobbs discharge to home/self care  Follow-up Information     Follow up With Specialties Details Why Contact Info Additional Information    Ezekiel Reinoso MD Family Medicine Schedule an appointment as soon as possible for a visit in 1 week for re-evaluation [address]  [address] [facility]  [id number] [place] [address] Drive [address] [facility] Emergency Medicine Go to  If symptoms worsen [address]  [address] [facility] [address] [place] [address] [place] [facility], [facility] [address] [place], [place], [address]          Discharge Medication List as of 3/1/2019 11:52 PM      CONTINUE these medications which have NOT CHANGED    Details   albuterol (PROAIR HFA) 90 mcg/act inhaler 2 puffs as needed, Historical Med      levothyroxine 88 mcg tablet Take 1 tablet (88 mcg total) by mouth daily, Starting Wed 8/22/2018, Normal      lisinopril (ZESTRIL) 10 mg tablet Take 10 mg by mouth daily  , Starting Sun 8/19/2018, Historical Med      simvastatin (ZOCOR) 20 mg tablet Take 20 mg by mouth daily at bedtime  , Historical Med      meclizine (ANTIVERT) 12 5 MG tablet 1 tablet as needed, Historical Med           No discharge procedures on file      ED Provider  Electronically Signed by           Radha Light DO  03/02/19 5283

## 2019-03-02 VITALS
HEART RATE: 83 BPM | TEMPERATURE: 98.7 F | BODY MASS INDEX: 31.12 KG/M2 | DIASTOLIC BLOOD PRESSURE: 59 MMHG | RESPIRATION RATE: 14 BRPM | SYSTOLIC BLOOD PRESSURE: 119 MMHG | OXYGEN SATURATION: 99 % | WEIGHT: 187 LBS

## 2019-03-02 LAB
ATRIAL RATE: 84 BPM
ATRIAL RATE: 86 BPM
P AXIS: 61 DEGREES
P AXIS: 62 DEGREES
PR INTERVAL: 160 MS
PR INTERVAL: 166 MS
QRS AXIS: -71 DEGREES
QRS AXIS: -76 DEGREES
QRSD INTERVAL: 94 MS
QRSD INTERVAL: 96 MS
QT INTERVAL: 372 MS
QT INTERVAL: 386 MS
QTC INTERVAL: 445 MS
QTC INTERVAL: 456 MS
T WAVE AXIS: 70 DEGREES
T WAVE AXIS: 89 DEGREES
VENTRICULAR RATE: 84 BPM
VENTRICULAR RATE: 86 BPM

## 2019-03-02 PROCEDURE — 93010 ELECTROCARDIOGRAM REPORT: CPT | Performed by: INTERNAL MEDICINE

## 2019-03-02 NOTE — DISCHARGE INSTRUCTIONS
Chest Pain   WHAT YOU NEED TO KNOW:   Chest pain can be caused by a range of conditions, from not serious to life-threatening  Chest pain can be a symptom of a digestive problem, such as acid reflux or a stomach ulcer  An anxiety attack or a strong emotion, such as anger, can also cause chest pain  Infection, inflammation, or a fracture in the bones or cartilage in your chest can cause pain or discomfort  Sometimes chest pain or pressure is caused by poor blood flow to your heart (angina)  Chest pain may also be caused by life-threatening conditions such as a heart attack or blood clot in your lungs  DISCHARGE INSTRUCTIONS:   Call 911 if:   · You have any of the following signs of a heart attack:      ¨ Squeezing, pressure, or pain in your chest that lasts longer than 5 minutes or returns    ¨ Discomfort or pain in your back, neck, jaw, stomach, or arm     ¨ Trouble breathing    ¨ Nausea or vomiting    ¨ Lightheadedness or a sudden cold sweat, especially with chest pain or trouble breathing    Return to the emergency department if:   · You have chest discomfort that gets worse, even with medicine  · You cough or vomit blood  · Your bowel movements are black or bloody  · You cannot stop vomiting, or it hurts to swallow  Contact your healthcare provider if:   · You have questions or concerns about your condition or care  Medicines:   · Medicines  may be given to treat the cause of your chest pain  Examples include pain medicine, anxiety medicine, or medicines to increase blood flow to your heart  · Do not take certain medicines without asking your healthcare provider first   These include NSAIDs, herbal or vitamin supplements, or hormones (estrogen or progestin)  · Take your medicine as directed  Contact your healthcare provider if you think your medicine is not helping or if you have side effects  Tell him or her if you are allergic to any medicine   Keep a list of the medicines, vitamins, and herbs you take  Include the amounts, and when and why you take them  Bring the list or the pill bottles to follow-up visits  Carry your medicine list with you in case of an emergency  Follow up with your healthcare provider within 72 hours, or as directed: You may need to return for more tests to find the cause of your chest pain  You may be referred to a specialist, such as a cardiologist or gastroenterologist  Write down your questions so you remember to ask them during your visits  Healthy living tips: The following are general healthy guidelines  If your chest pain is caused by a heart problem, your healthcare provider will give you specific guidelines to follow  · Do not smoke  Nicotine and other chemicals in cigarettes and cigars can cause lung and heart damage  Ask your healthcare provider for information if you currently smoke and need help to quit  E-cigarettes or smokeless tobacco still contain nicotine  Talk to your healthcare provider before you use these products  · Eat a variety of healthy, low-fat foods  Healthy foods include fruits, vegetables, whole-grain breads, low-fat dairy products, beans, lean meats, and fish  Ask for more information about a heart healthy diet  · Ask about activity  Your healthcare provider will tell you which activities to limit or avoid  Ask when you can drive, return to work, and have sex  Ask about the best exercise plan for you  · Maintain a healthy weight  Ask your healthcare provider how much you should weigh  Ask him or her to help you create a weight loss plan if you are overweight  · Get the flu and pneumonia vaccines  All adults should get the influenza (flu) vaccine  Get it every year as soon as it becomes available  The pneumococcal vaccine is given to adults aged 72 years or older  The vaccine is given every 5 years to prevent pneumococcal disease, such as pneumonia    © 2017 [facility] [employer] [facility] Information is for End User's use only and may not be sold, redistributed or otherwise used for commercial purposes  All illustrations and images included in CareNotes® are the copyrighted property of A D A M , Inc  or Charles Vallejo  The above information is an  only  It is not intended as medical advice for individual conditions or treatments  Talk to your doctor, nurse or pharmacist before following any medical regimen to see if it is safe and effective for you

## 2019-03-02 NOTE — ED CARE HANDOFF
Emergency Department Sign Out Note        Sign out and transfer of care from Dr Damaso Myers  See Separate Emergency Department note  The patient, Chasidy Zavaleta, was evaluated by the previous provider for chest pain  Workup Completed:  Labs unremarkable  EKG unremarkable  HEART score 3  3 hour repeat troponin and EKG pending  ED Course / Workup Pending (followup):  Repeat EKG unchanged  Repeat troponin negative  PCP follow up  Discussed return precautions with patient  HEART Risk Score      Most Recent Value   History  0 Filed at: 03/01/2019 2151   ECG  0 Filed at: 03/01/2019 2151   Age  2 Filed at: 03/01/2019 2151   Risk Factors  1 Filed at: 03/01/2019 2151   Troponin  0 Filed at: 03/01/2019 2151   Heart Score Risk Calculator   History  0 Filed at: 03/01/2019 2151   ECG  0 Filed at: 03/01/2019 2151   Age  2 Filed at: 03/01/2019 2151   Risk Factors  1 Filed at: 03/01/2019 2151   Troponin  0 Filed at: 03/01/2019 2151   HEART Score  3 Filed at: 03/01/2019 2151   HEART Score  3 Filed at: 03/01/2019 2151                             Procedures  MDM    Disposition  Final diagnoses:   Chest pain     Time reflects when diagnosis was documented in both MDM as applicable and the Disposition within this note     Time User Action Codes Description Comment    3/1/2019 11:51 PM Servando Pizano Add [R07 9] Chest pain       ED Disposition     ED Disposition Condition Date/Time Comment    Discharge Stable Fri Mar 1, 2019 11:52 PM Chasidy Zavaleta discharge to home/self care              Follow-up Information     Follow up With Specialties Details Why Contact Info Additional Information    Zandra Rain MD Family Medicine Schedule an appointment as soon as possible for a visit in 1 week for re-evaluation [address] [facility] [address] [facility] Emergency Department Emergency Medicine Go to  If symptoms worsen [address] [facility]  Rina Wilder [address] ED, [address], [address]        Discharge Medication List as of 3/1/2019 11:52 PM      CONTINUE these medications which have NOT CHANGED    Details   albuterol (PROAIR HFA) 90 mcg/act inhaler 2 puffs as needed, Historical Med      levothyroxine 88 mcg tablet Take 1 tablet (88 mcg total) by mouth daily, Starting Wed 8/22/2018, Normal      lisinopril (ZESTRIL) 10 mg tablet Take 10 mg by mouth daily  , Starting Sun 8/19/2018, Historical Med      simvastatin (ZOCOR) 20 mg tablet Take 20 mg by mouth daily at bedtime  , Historical Med      meclizine (ANTIVERT) 12 5 MG tablet 1 tablet as needed, Historical Med           No discharge procedures on file         ED Provider  Electronically Signed by     Audra Boyd MD  03/02/19 4888

## 2019-04-11 ENCOUNTER — HOSPITAL ENCOUNTER (EMERGENCY)
Facility: HOSPITAL | Age: 69
Discharge: HOME | End: 2019-04-11
Attending: EMERGENCY MEDICINE
Payer: MEDICARE

## 2019-04-11 ENCOUNTER — APPOINTMENT (EMERGENCY)
Dept: RADIOLOGY | Facility: HOSPITAL | Age: 69
End: 2019-04-11
Payer: MEDICARE

## 2019-04-11 ENCOUNTER — APPOINTMENT (EMERGENCY)
Dept: CT IMAGING | Facility: HOSPITAL | Age: 69
End: 2019-04-11
Payer: MEDICARE

## 2019-04-11 VITALS
RESPIRATION RATE: 20 BRPM | SYSTOLIC BLOOD PRESSURE: 143 MMHG | OXYGEN SATURATION: 98 % | DIASTOLIC BLOOD PRESSURE: 75 MMHG | HEART RATE: 71 BPM | TEMPERATURE: 97.6 F

## 2019-04-11 DIAGNOSIS — F41.9 ANXIETY: ICD-10-CM

## 2019-04-11 DIAGNOSIS — R07.9 CHEST PAIN, UNSPECIFIED: Primary | ICD-10-CM

## 2019-04-11 LAB
ALBUMIN SERPL BCP-MCNC: 3.7 G/DL (ref 3.5–5)
ALP SERPL-CCNC: 74 U/L (ref 46–116)
ALT SERPL W P-5'-P-CCNC: 21 U/L (ref 12–78)
ANION GAP SERPL CALCULATED.3IONS-SCNC: 8 MMOL/L (ref 4–13)
APTT PPP: 29 SECONDS (ref 26–38)
AST SERPL W P-5'-P-CCNC: 18 U/L (ref 5–45)
ATRIAL RATE: 81 BPM
BASOPHILS # BLD AUTO: 0.06 THOUSANDS/ΜL (ref 0–0.1)
BASOPHILS NFR BLD AUTO: 1 % (ref 0–1)
BILIRUB SERPL-MCNC: 0.8 MG/DL (ref 0.2–1)
BUN SERPL-MCNC: 14 MG/DL (ref 5–25)
CALCIUM SERPL-MCNC: 9.4 MG/DL (ref 8.3–10.1)
CHLORIDE SERPL-SCNC: 103 MMOL/L (ref 100–108)
CO2 SERPL-SCNC: 26 MMOL/L (ref 21–32)
CREAT SERPL-MCNC: 1.08 MG/DL (ref 0.6–1.3)
DEPRECATED D DIMER PPP: 1496 NG/ML (FEU)
EOSINOPHIL # BLD AUTO: 0.13 THOUSAND/ΜL (ref 0–0.61)
EOSINOPHIL NFR BLD AUTO: 2 % (ref 0–6)
ERYTHROCYTE [DISTWIDTH] IN BLOOD BY AUTOMATED COUNT: 12.6 % (ref 11.6–15.1)
GFR SERPL CREATININE-BSD FRML MDRD: 52 ML/MIN/1.73SQ M
GLUCOSE SERPL-MCNC: 116 MG/DL (ref 65–140)
HCT VFR BLD AUTO: 37.5 % (ref 34.8–46.1)
HGB BLD-MCNC: 12 G/DL (ref 11.5–15.4)
IMM GRANULOCYTES # BLD AUTO: 0.02 THOUSAND/UL (ref 0–0.2)
IMM GRANULOCYTES NFR BLD AUTO: 0 % (ref 0–2)
INR PPP: 1.01 (ref 0.86–1.17)
LYMPHOCYTES # BLD AUTO: 1.82 THOUSANDS/ΜL (ref 0.6–4.47)
LYMPHOCYTES NFR BLD AUTO: 25 % (ref 14–44)
MCH RBC QN AUTO: 31.5 PG (ref 26.8–34.3)
MCHC RBC AUTO-ENTMCNC: 32 G/DL (ref 31.4–37.4)
MCV RBC AUTO: 98 FL (ref 82–98)
MONOCYTES # BLD AUTO: 0.56 THOUSAND/ΜL (ref 0.17–1.22)
MONOCYTES NFR BLD AUTO: 8 % (ref 4–12)
NEUTROPHILS # BLD AUTO: 4.57 THOUSANDS/ΜL (ref 1.85–7.62)
NEUTS SEG NFR BLD AUTO: 64 % (ref 43–75)
NRBC BLD AUTO-RTO: 0 /100 WBCS
P AXIS: 61 DEGREES
PLATELET # BLD AUTO: 244 THOUSANDS/UL (ref 149–390)
PMV BLD AUTO: 10.3 FL (ref 8.9–12.7)
POTASSIUM SERPL-SCNC: 3.8 MMOL/L (ref 3.5–5.3)
PR INTERVAL: 168 MS
PROT SERPL-MCNC: 8.1 G/DL (ref 6.4–8.2)
PROTHROMBIN TIME: 12.7 SECONDS (ref 11.8–14.2)
QRS AXIS: -70 DEGREES
QRSD INTERVAL: 98 MS
QT INTERVAL: 380 MS
QTC INTERVAL: 441 MS
RBC # BLD AUTO: 3.81 MILLION/UL (ref 3.81–5.12)
SODIUM SERPL-SCNC: 137 MMOL/L (ref 136–145)
T WAVE AXIS: 72 DEGREES
TROPONIN I SERPL-MCNC: <0.02 NG/ML
VENTRICULAR RATE: 81 BPM
WBC # BLD AUTO: 7.16 THOUSAND/UL (ref 4.31–10.16)

## 2019-04-11 PROCEDURE — 85379 FIBRIN DEGRADATION QUANT: CPT | Performed by: PHYSICIAN ASSISTANT

## 2019-04-11 PROCEDURE — 71275 CT ANGIOGRAPHY CHEST: CPT

## 2019-04-11 PROCEDURE — 93010 ELECTROCARDIOGRAM REPORT: CPT | Performed by: INTERNAL MEDICINE

## 2019-04-11 PROCEDURE — 93005 ELECTROCARDIOGRAM TRACING: CPT

## 2019-04-11 PROCEDURE — 85025 COMPLETE CBC W/AUTO DIFF WBC: CPT | Performed by: PHYSICIAN ASSISTANT

## 2019-04-11 PROCEDURE — 99284 EMERGENCY DEPT VISIT MOD MDM: CPT | Performed by: PHYSICIAN ASSISTANT

## 2019-04-11 PROCEDURE — 80053 COMPREHEN METABOLIC PANEL: CPT | Performed by: PHYSICIAN ASSISTANT

## 2019-04-11 PROCEDURE — 84484 ASSAY OF TROPONIN QUANT: CPT | Performed by: PHYSICIAN ASSISTANT

## 2019-04-11 PROCEDURE — 96361 HYDRATE IV INFUSION ADD-ON: CPT

## 2019-04-11 PROCEDURE — 96360 HYDRATION IV INFUSION INIT: CPT

## 2019-04-11 PROCEDURE — 99285 EMERGENCY DEPT VISIT HI MDM: CPT

## 2019-04-11 PROCEDURE — 85730 THROMBOPLASTIN TIME PARTIAL: CPT | Performed by: PHYSICIAN ASSISTANT

## 2019-04-11 PROCEDURE — 71046 X-RAY EXAM CHEST 2 VIEWS: CPT

## 2019-04-11 PROCEDURE — 36415 COLL VENOUS BLD VENIPUNCTURE: CPT | Performed by: PHYSICIAN ASSISTANT

## 2019-04-11 PROCEDURE — 85610 PROTHROMBIN TIME: CPT | Performed by: PHYSICIAN ASSISTANT

## 2019-04-11 RX ORDER — ASPIRIN 81 MG/1
324 TABLET, CHEWABLE ORAL ONCE
Status: COMPLETED | OUTPATIENT
Start: 2019-04-11 | End: 2019-04-11

## 2019-04-11 RX ORDER — OMEPRAZOLE 20 MG/1
20 CAPSULE, DELAYED RELEASE ORAL DAILY
COMMUNITY
End: 2019-05-09 | Stop reason: ALTCHOICE

## 2019-04-11 RX ADMIN — SODIUM CHLORIDE 500 ML: 0.9 INJECTION, SOLUTION INTRAVENOUS at 17:13

## 2019-04-11 RX ADMIN — IOHEXOL 100 ML: 350 INJECTION, SOLUTION INTRAVENOUS at 17:34

## 2019-04-11 RX ADMIN — ASPIRIN 81 MG 324 MG: 81 TABLET ORAL at 16:08

## 2019-05-09 ENCOUNTER — OFFICE VISIT (OUTPATIENT)
Dept: CARDIOLOGY CLINIC | Facility: CLINIC | Age: 69
End: 2019-05-09
Payer: MEDICARE

## 2019-05-09 VITALS
SYSTOLIC BLOOD PRESSURE: 120 MMHG | HEART RATE: 68 BPM | BODY MASS INDEX: 30.82 KG/M2 | DIASTOLIC BLOOD PRESSURE: 70 MMHG | HEIGHT: 65 IN | WEIGHT: 185 LBS

## 2019-05-09 DIAGNOSIS — R07.1 CHEST PAIN ON BREATHING: Primary | ICD-10-CM

## 2019-05-09 DIAGNOSIS — I10 ESSENTIAL HYPERTENSION: ICD-10-CM

## 2019-05-09 DIAGNOSIS — I47.1 PSVT (PAROXYSMAL SUPRAVENTRICULAR TACHYCARDIA) (HCC): ICD-10-CM

## 2019-05-09 PROCEDURE — 99214 OFFICE O/P EST MOD 30 MIN: CPT | Performed by: INTERNAL MEDICINE

## 2019-05-09 RX ORDER — KETOCONAZOLE 20 MG/G
CREAM TOPICAL
Refills: 3 | COMMUNITY
Start: 2019-03-12 | End: 2021-08-24

## 2019-05-09 RX ORDER — PANTOPRAZOLE SODIUM 40 MG/1
40 TABLET, DELAYED RELEASE ORAL 2 TIMES DAILY
Refills: 3 | COMMUNITY
Start: 2019-04-30

## 2019-05-10 ENCOUNTER — HOSPITAL ENCOUNTER (OUTPATIENT)
Dept: NON INVASIVE DIAGNOSTICS | Facility: CLINIC | Age: 69
Discharge: HOME | End: 2019-05-10
Payer: MEDICARE

## 2019-05-10 DIAGNOSIS — R07.1 CHEST PAIN ON BREATHING: ICD-10-CM

## 2019-05-10 LAB
CHEST PAIN STATEMENT: NORMAL
MAX DIASTOLIC BP: 74 MMHG
MAX HEART RATE: 109 BPM
MAX PREDICTED HEART RATE: 149 BPM
MAX. SYSTOLIC BP: 132 MMHG
PROTOCOL NAME: NORMAL
REASON FOR TERMINATION: NORMAL
TARGET HR FORMULA: NORMAL
TEST INDICATION: NORMAL
TIME IN EXERCISE PHASE: NORMAL

## 2019-05-10 PROCEDURE — 93018 CV STRESS TEST I&R ONLY: CPT | Performed by: INTERNAL MEDICINE

## 2019-05-10 PROCEDURE — 93017 CV STRESS TEST TRACING ONLY: CPT

## 2019-05-10 PROCEDURE — 78452 HT MUSCLE IMAGE SPECT MULT: CPT

## 2019-05-10 PROCEDURE — A9502 TC99M TETROFOSMIN: HCPCS

## 2019-05-10 PROCEDURE — 93016 CV STRESS TEST SUPVJ ONLY: CPT | Performed by: INTERNAL MEDICINE

## 2019-05-10 PROCEDURE — 78452 HT MUSCLE IMAGE SPECT MULT: CPT | Performed by: INTERNAL MEDICINE

## 2019-05-10 RX ADMIN — REGADENOSON 0.4 MG: 0.08 INJECTION, SOLUTION INTRAVENOUS at 11:13

## 2019-05-30 ENCOUNTER — HOSPITAL ENCOUNTER (OUTPATIENT)
Dept: MAMMOGRAPHY | Facility: CLINIC | Age: 69
Discharge: HOME | End: 2019-05-30
Payer: MEDICARE

## 2019-05-30 VITALS — WEIGHT: 185 LBS | HEIGHT: 65 IN | BODY MASS INDEX: 30.82 KG/M2

## 2019-05-30 DIAGNOSIS — Z12.31 ENCOUNTER FOR SCREENING MAMMOGRAM FOR BREAST CANCER: ICD-10-CM

## 2019-05-30 PROCEDURE — 77067 SCR MAMMO BI INCL CAD: CPT

## 2019-07-01 ENCOUNTER — APPOINTMENT (OUTPATIENT)
Dept: RADIOLOGY | Facility: CLINIC | Age: 69
End: 2019-07-01
Payer: MEDICARE

## 2019-07-01 DIAGNOSIS — R05.9 COUGH: ICD-10-CM

## 2019-07-01 PROCEDURE — 71046 X-RAY EXAM CHEST 2 VIEWS: CPT

## 2019-07-23 ENCOUNTER — TRANSCRIBE ORDERS (OUTPATIENT)
Dept: ADMINISTRATIVE | Facility: HOSPITAL | Age: 69
End: 2019-07-23

## 2019-07-23 ENCOUNTER — APPOINTMENT (OUTPATIENT)
Dept: RADIOLOGY | Facility: CLINIC | Age: 69
End: 2019-07-23
Payer: MEDICARE

## 2019-07-23 DIAGNOSIS — R93.89 ABNORMAL CXR (CHEST X-RAY): Primary | ICD-10-CM

## 2019-07-23 DIAGNOSIS — R93.89 ABNORMAL CXR (CHEST X-RAY): ICD-10-CM

## 2019-07-23 PROCEDURE — 71046 X-RAY EXAM CHEST 2 VIEWS: CPT

## 2019-08-12 ENCOUNTER — TELEPHONE (OUTPATIENT)
Dept: ENDOCRINOLOGY | Facility: HOSPITAL | Age: 69
End: 2019-08-12

## 2019-08-12 ENCOUNTER — APPOINTMENT (OUTPATIENT)
Dept: LAB | Facility: CLINIC | Age: 69
End: 2019-08-12
Payer: MEDICARE

## 2019-08-12 DIAGNOSIS — E06.3 HYPOTHYROIDISM DUE TO HASHIMOTO'S THYROIDITIS: ICD-10-CM

## 2019-08-12 DIAGNOSIS — E03.8 HYPOTHYROIDISM DUE TO HASHIMOTO'S THYROIDITIS: ICD-10-CM

## 2019-08-12 LAB
T4 FREE SERPL-MCNC: 1.35 NG/DL (ref 0.76–1.46)
TSH SERPL DL<=0.05 MIU/L-ACNC: 1.14 UIU/ML (ref 0.36–3.74)

## 2019-08-12 PROCEDURE — 36415 COLL VENOUS BLD VENIPUNCTURE: CPT

## 2019-08-12 PROCEDURE — 84443 ASSAY THYROID STIM HORMONE: CPT

## 2019-08-12 PROCEDURE — 84439 ASSAY OF FREE THYROXINE: CPT

## 2019-08-12 NOTE — TELEPHONE ENCOUNTER
Pt needs a refill of her Levothyroxine  She just got her blood work done and wants to make sure you're not going to make any changes before it gets refilled  She has an appt with you for her 1 year follow up next week  Either way she needs a 30 day with refills sent to [facility] in [place]

## 2019-08-12 NOTE — TELEPHONE ENCOUNTER
Blood work is normal   No change in levothyroxine dosage for now   Prescription can be sent to [facility] and [place]

## 2019-08-13 DIAGNOSIS — E06.3 HYPOTHYROIDISM DUE TO HASHIMOTO'S THYROIDITIS: ICD-10-CM

## 2019-08-13 DIAGNOSIS — E03.8 HYPOTHYROIDISM DUE TO HASHIMOTO'S THYROIDITIS: ICD-10-CM

## 2019-08-13 RX ORDER — LEVOTHYROXINE SODIUM 88 UG/1
88 TABLET ORAL DAILY
Qty: 30 TABLET | Refills: 11 | Status: SHIPPED | OUTPATIENT
Start: 2019-08-13 | End: 2020-08-03 | Stop reason: SDUPTHER

## 2019-08-22 ENCOUNTER — OFFICE VISIT (OUTPATIENT)
Dept: ENDOCRINOLOGY | Facility: HOSPITAL | Age: 69
End: 2019-08-22
Payer: MEDICARE

## 2019-08-22 VITALS
BODY MASS INDEX: 31.32 KG/M2 | HEIGHT: 65 IN | DIASTOLIC BLOOD PRESSURE: 64 MMHG | HEART RATE: 96 BPM | WEIGHT: 188 LBS | SYSTOLIC BLOOD PRESSURE: 122 MMHG

## 2019-08-22 DIAGNOSIS — E03.8 HYPOTHYROIDISM DUE TO HASHIMOTO'S THYROIDITIS: Primary | ICD-10-CM

## 2019-08-22 DIAGNOSIS — E06.3 HYPOTHYROIDISM DUE TO HASHIMOTO'S THYROIDITIS: Primary | ICD-10-CM

## 2019-08-22 PROCEDURE — 99213 OFFICE O/P EST LOW 20 MIN: CPT | Performed by: INTERNAL MEDICINE

## 2019-08-22 NOTE — PROGRESS NOTES
8/22/2019    Assessment/Plan      Diagnoses and all orders for this visit:    Hypothyroidism due to Hashimoto's thyroiditis  -     TSH, 3rd generation Lab Collect; Future  -     T4, free Lab Collect; Future        Assessment/Plan:    Hypothyroidism due to Hashimoto's thyroiditis  Most recent thyroid function tests are normal   She is biochemically and clinically euthyroid  She will continue the same levothyroxine 88 mcg daily  I have asked her to follow up in 1 year with preceding TSH and free T4       CC:   Hypothyroid follow-up    History of Present Illness     HPI: Alida Gonzalez is a 68y o  year old female with history of  Hypothyroidism due to Hashimoto's thyroiditis here for follow-up  She was diagnosed with hypothyroidism over 21 years ago  She is currently taking levothyroxine 88 mcg daily  She denies heat or cold intolerance, diarrhea or constipation, anxiety or depression, palpitation, or tremors  She does have some variable sleep disturbances in that she sometimes can get back to sleep if she awakens  She denies fatigue  She has some dry skin but no brittle nails or hair loss  Weight is 7 lb less than last year which she lost when her  was in the hospital   She has no diplopia  She has no compressive thyroid symptoms or difficulties with swallowing  Has been under a lot of stress   had MI in jan 2019 and underwent CABG X 4 vessels  She had cardiac workup in may 2019 with new cardiologist  She was having chest pains and nausea with stress  Review of Systems   Constitutional: Positive for unexpected weight change  Negative for fatigue  Weight 7 lb less than last year  This occurred with stress over 's illness  HENT: Negative for trouble swallowing  Eyes: Negative for visual disturbance  No diplopia  Wears glasses  Respiratory: Positive for chest tightness  Negative for shortness of breath  Will have chest tightness with stress  Cardiovascular: Negative for chest pain and palpitations  Gastrointestinal: Positive for nausea  Negative for abdominal pain, constipation and diarrhea  Nausea with stress  Endocrine: Negative for cold intolerance and heat intolerance  Skin: Negative for rash  Has dry skin  Will get rashes with bug bites  No brittle nails  No hair loss  Neurological: Negative for dizziness, tremors, light-headedness and headaches  Psychiatric/Behavioral: Positive for sleep disturbance  Negative for dysphoric mood  The patient is not nervous/anxious  Sleep can be variable  Will wake and not be able to get back to sleep         Historical Information   Past Medical History:   Diagnosis Date    Asthma     Atrial fibrillation ([address] )     Breast cyst     Disease of thyroid gland     Endometriosis     Fibroid     Gout     Hyperlipidemia     Hypertension     Osteoarthritis     Silent myocardial infarction (HCC)     Vertigo     at times     Past Surgical History:   Procedure Laterality Date    BREAST CYST ASPIRATION Bilateral     CARDIAC ELECTROPHYSIOLOGY STUDY AND ABLATION      CYST REMOVAL Bilateral     aspiration    HYSTERECTOMY      age 41    OOPHORECTOMY Bilateral     age 41   [facility] REPLACEMENT TOTAL KNEE BILATERAL Bilateral     TONSILLECTOMY AND ADENOIDECTOMY       Social History   Social History     Substance and Sexual Activity   Alcohol Use No     Social History     Substance and Sexual Activity   Drug Use No     Social History     Tobacco Use   Smoking Status Never Smoker   Smokeless Tobacco Never Used     Family History:   Family History   Problem Relation Age of Onset    Alzheimer's disease Mother     Hypertension Father     Heart attack Father     Liver disease Brother         on O2    No Known Problems Daughter     Obesity Son         post gastric bypass       Meds/Allergies   Current Outpatient Medications   Medication Sig Dispense Refill    albuterol (PROAIR HFA) 90 mcg/act inhaler 2 puffs as needed      ketoconazole (NIZORAL) 2 % cream APPLY TO THE AFFECTED AREA(S) TWICE DAILY FOR 4 WEEKS  3    levothyroxine 88 mcg tablet Take 1 tablet (88 mcg total) by mouth daily 30 tablet 11    lisinopril (ZESTRIL) 10 mg tablet Take 10 mg by mouth daily        meclizine (ANTIVERT) 12 5 MG tablet 1 tablet as needed      pantoprazole (PROTONIX) 40 mg tablet Take 40 mg by mouth 2 (two) times a day  3    simvastatin (ZOCOR) 20 mg tablet Take 20 mg by mouth daily at bedtime         No current facility-administered medications for this visit  Allergies   Allergen Reactions    Penicillin G      Other reaction(s): anaphylaxis    Erythromycin      Other reaction(s): n/v       Objective   Vitals: Blood pressure 122/64, pulse 96, height 5' 5" (1 651 m), weight 85 3 kg (188 lb)  Invasive Devices     Peripheral Intravenous Line            Peripheral IV 05/10/19 Left Antecubital 103 days                Physical Exam   Constitutional: She is oriented to person, place, and time  She appears well-developed and well-nourished  HENT:   Head: Normocephalic and atraumatic  Eyes: Pupils are equal, round, and reactive to light  Conjunctivae and EOM are normal      No lid lag, stare, proptosis, or periorbital edema  Neck: Normal range of motion  Neck supple  No thyromegaly present  Thyroid normal in size  No palpable thyroid nodules  No bruits over the thyroid gland or carotids  Cardiovascular: Normal rate, regular rhythm and normal heart sounds  No murmur heard  Pulmonary/Chest: Effort normal and breath sounds normal  She has no wheezes  Musculoskeletal: Normal range of motion  She exhibits no edema or deformity  No tremor of the outstretched hands  Lymphadenopathy:     She has no cervical adenopathy  Neurological: She is alert and oriented to person, place, and time  She has normal reflexes  Deep tendon reflexes normal without briskness  Skin: Skin is warm and dry   No rash noted    Vitals reviewed  The history was obtained from the review of the chart and from the patient  Lab Results:      Blood work done on 08/12/2019 demonstrates the following results: TSH is 1 14 with a free T4 of 1 35      Future Appointments   Date Time Provider Robson Mcintyre   8/24/2020  8:00 AM Dallin Mon MD [facility]

## 2019-10-17 ENCOUNTER — APPOINTMENT (OUTPATIENT)
Dept: LAB | Facility: CLINIC | Age: 69
End: 2019-10-17
Payer: MEDICARE

## 2019-10-17 ENCOUNTER — TRANSCRIBE ORDERS (OUTPATIENT)
Dept: ADMINISTRATIVE | Facility: HOSPITAL | Age: 69
End: 2019-10-17

## 2019-10-17 DIAGNOSIS — E78.5 HYPERLIPIDEMIA, UNSPECIFIED HYPERLIPIDEMIA TYPE: ICD-10-CM

## 2019-10-17 DIAGNOSIS — E03.9 HYPOTHYROIDISM, UNSPECIFIED TYPE: ICD-10-CM

## 2019-10-17 DIAGNOSIS — E03.9 HYPOTHYROIDISM, UNSPECIFIED TYPE: Primary | ICD-10-CM

## 2019-10-17 LAB
ALBUMIN SERPL BCP-MCNC: 3.5 G/DL (ref 3.5–5)
ALP SERPL-CCNC: 74 U/L (ref 46–116)
ALT SERPL W P-5'-P-CCNC: 25 U/L (ref 12–78)
ANION GAP SERPL CALCULATED.3IONS-SCNC: 5 MMOL/L (ref 4–13)
AST SERPL W P-5'-P-CCNC: 20 U/L (ref 5–45)
BILIRUB SERPL-MCNC: 0.76 MG/DL (ref 0.2–1)
BUN SERPL-MCNC: 18 MG/DL (ref 5–25)
CALCIUM SERPL-MCNC: 9 MG/DL (ref 8.3–10.1)
CHLORIDE SERPL-SCNC: 109 MMOL/L (ref 100–108)
CHOLEST SERPL-MCNC: 165 MG/DL (ref 50–200)
CO2 SERPL-SCNC: 26 MMOL/L (ref 21–32)
CREAT SERPL-MCNC: 1.07 MG/DL (ref 0.6–1.3)
GFR SERPL CREATININE-BSD FRML MDRD: 52 ML/MIN/1.73SQ M
GLUCOSE P FAST SERPL-MCNC: 88 MG/DL (ref 65–99)
HDLC SERPL-MCNC: 57 MG/DL (ref 40–60)
LDLC SERPL CALC-MCNC: 94 MG/DL (ref 0–100)
NONHDLC SERPL-MCNC: 108 MG/DL
POTASSIUM SERPL-SCNC: 4.2 MMOL/L (ref 3.5–5.3)
PROT SERPL-MCNC: 7.3 G/DL (ref 6.4–8.2)
SODIUM SERPL-SCNC: 140 MMOL/L (ref 136–145)
TRIGL SERPL-MCNC: 68 MG/DL

## 2019-10-17 PROCEDURE — 80061 LIPID PANEL: CPT

## 2019-10-17 PROCEDURE — 36415 COLL VENOUS BLD VENIPUNCTURE: CPT

## 2019-10-17 PROCEDURE — 80053 COMPREHEN METABOLIC PANEL: CPT

## 2020-05-07 ENCOUNTER — TRANSCRIBE ORDERS (OUTPATIENT)
Dept: ADMINISTRATIVE | Facility: HOSPITAL | Age: 70
End: 2020-05-07

## 2020-05-07 DIAGNOSIS — R63.0 NO APPETITE: ICD-10-CM

## 2020-05-07 DIAGNOSIS — R93.89 ABNORMAL RADIOLOGICAL FINDINGS IN SKIN AND SUBCUTANEOUS TISSUE: ICD-10-CM

## 2020-05-07 DIAGNOSIS — R11.0 NAUSEA: Primary | ICD-10-CM

## 2020-05-11 ENCOUNTER — HOSPITAL ENCOUNTER (OUTPATIENT)
Dept: ULTRASOUND IMAGING | Facility: HOSPITAL | Age: 70
Discharge: HOME | End: 2020-05-11
Attending: FAMILY MEDICINE
Payer: MEDICARE

## 2020-05-11 DIAGNOSIS — R11.0 NAUSEA: ICD-10-CM

## 2020-05-11 PROCEDURE — 76705 ECHO EXAM OF ABDOMEN: CPT

## 2020-05-19 ENCOUNTER — TRANSCRIBE ORDERS (OUTPATIENT)
Dept: ADMINISTRATIVE | Facility: HOSPITAL | Age: 70
End: 2020-05-19

## 2020-05-19 ENCOUNTER — APPOINTMENT (OUTPATIENT)
Dept: LAB | Facility: HOSPITAL | Age: 70
End: 2020-05-19
Attending: FAMILY MEDICINE
Payer: MEDICARE

## 2020-05-19 DIAGNOSIS — R93.89 ABNORMAL RADIOLOGICAL FINDINGS IN SKIN AND SUBCUTANEOUS TISSUE: ICD-10-CM

## 2020-05-19 DIAGNOSIS — R11.0 NAUSEA: ICD-10-CM

## 2020-05-19 DIAGNOSIS — R63.0 ANOREXIA: ICD-10-CM

## 2020-05-19 DIAGNOSIS — R11.0 NAUSEA: Primary | ICD-10-CM

## 2020-05-19 LAB
ANION GAP SERPL CALCULATED.3IONS-SCNC: 5 MMOL/L (ref 4–13)
BUN SERPL-MCNC: 21 MG/DL (ref 5–25)
CALCIUM SERPL-MCNC: 8.8 MG/DL (ref 8.3–10.1)
CHLORIDE SERPL-SCNC: 105 MMOL/L (ref 100–108)
CO2 SERPL-SCNC: 26 MMOL/L (ref 21–32)
CREAT SERPL-MCNC: 1.29 MG/DL (ref 0.6–1.3)
GFR SERPL CREATININE-BSD FRML MDRD: 41 ML/MIN/1.73SQ M
GLUCOSE P FAST SERPL-MCNC: 90 MG/DL (ref 65–99)
POTASSIUM SERPL-SCNC: 4.3 MMOL/L (ref 3.5–5.3)
SODIUM SERPL-SCNC: 136 MMOL/L (ref 136–145)

## 2020-05-19 PROCEDURE — 36415 COLL VENOUS BLD VENIPUNCTURE: CPT

## 2020-05-19 PROCEDURE — 80048 BASIC METABOLIC PNL TOTAL CA: CPT

## 2020-05-21 ENCOUNTER — HOSPITAL ENCOUNTER (OUTPATIENT)
Dept: MRI IMAGING | Facility: HOSPITAL | Age: 70
Discharge: HOME | End: 2020-05-21
Attending: FAMILY MEDICINE
Payer: MEDICARE

## 2020-05-21 DIAGNOSIS — R63.0 NO APPETITE: ICD-10-CM

## 2020-05-21 DIAGNOSIS — R93.89 ABNORMAL RADIOLOGICAL FINDINGS IN SKIN AND SUBCUTANEOUS TISSUE: ICD-10-CM

## 2020-05-21 DIAGNOSIS — R11.0 NAUSEA: ICD-10-CM

## 2020-05-21 PROCEDURE — 74183 MRI ABD W/O CNTR FLWD CNTR: CPT

## 2020-05-21 PROCEDURE — A9585 GADOBUTROL INJECTION: HCPCS | Performed by: RADIOLOGY

## 2020-05-21 RX ADMIN — GADOBUTROL 8 ML: 604.72 INJECTION INTRAVENOUS at 11:40

## 2020-06-01 ENCOUNTER — TELEPHONE (OUTPATIENT)
Dept: OBGYN CLINIC | Facility: CLINIC | Age: 70
End: 2020-06-01

## 2020-06-01 DIAGNOSIS — Z12.31 VISIT FOR SCREENING MAMMOGRAM: Primary | ICD-10-CM

## 2020-06-12 ENCOUNTER — HOSPITAL ENCOUNTER (OUTPATIENT)
Dept: BONE DENSITY | Facility: CLINIC | Age: 70
Discharge: HOME | End: 2020-06-12
Payer: MEDICARE

## 2020-06-12 VITALS — BODY MASS INDEX: 29.82 KG/M2 | WEIGHT: 179 LBS | HEIGHT: 65 IN

## 2020-06-12 DIAGNOSIS — Z12.31 VISIT FOR SCREENING MAMMOGRAM: ICD-10-CM

## 2020-06-12 PROCEDURE — 77063 BREAST TOMOSYNTHESIS BI: CPT

## 2020-06-12 PROCEDURE — 77067 SCR MAMMO BI INCL CAD: CPT

## 2020-08-03 ENCOUNTER — APPOINTMENT (OUTPATIENT)
Dept: LAB | Facility: CLINIC | Age: 70
End: 2020-08-03
Payer: MEDICARE

## 2020-08-03 DIAGNOSIS — E03.8 HYPOTHYROIDISM DUE TO HASHIMOTO'S THYROIDITIS: ICD-10-CM

## 2020-08-03 DIAGNOSIS — E06.3 HYPOTHYROIDISM DUE TO HASHIMOTO'S THYROIDITIS: ICD-10-CM

## 2020-08-03 LAB
T4 FREE SERPL-MCNC: 1.77 NG/DL (ref 0.76–1.46)
TSH SERPL DL<=0.05 MIU/L-ACNC: 0.5 UIU/ML (ref 0.36–3.74)

## 2020-08-03 PROCEDURE — 84443 ASSAY THYROID STIM HORMONE: CPT

## 2020-08-03 PROCEDURE — 84439 ASSAY OF FREE THYROXINE: CPT

## 2020-08-03 PROCEDURE — 36415 COLL VENOUS BLD VENIPUNCTURE: CPT

## 2020-08-03 RX ORDER — LEVOTHYROXINE SODIUM 88 UG/1
88 TABLET ORAL DAILY
Qty: 30 TABLET | Refills: 0 | Status: SHIPPED | OUTPATIENT
Start: 2020-08-03 | End: 2020-08-24 | Stop reason: SDUPTHER

## 2020-08-24 ENCOUNTER — TELEMEDICINE (OUTPATIENT)
Dept: ENDOCRINOLOGY | Facility: HOSPITAL | Age: 70
End: 2020-08-24
Payer: MEDICARE

## 2020-08-24 VITALS — WEIGHT: 179 LBS | BODY MASS INDEX: 29.79 KG/M2

## 2020-08-24 DIAGNOSIS — E03.8 HYPOTHYROIDISM DUE TO HASHIMOTO'S THYROIDITIS: Primary | ICD-10-CM

## 2020-08-24 DIAGNOSIS — E06.3 HYPOTHYROIDISM DUE TO HASHIMOTO'S THYROIDITIS: Primary | ICD-10-CM

## 2020-08-24 PROCEDURE — 99442 PR PHYS/QHP TELEPHONE EVALUATION 11-20 MIN: CPT | Performed by: INTERNAL MEDICINE

## 2020-08-24 RX ORDER — ONDANSETRON HYDROCHLORIDE 8 MG/1
TABLET, FILM COATED ORAL EVERY 8 HOURS PRN
COMMUNITY

## 2020-08-24 RX ORDER — LEVOTHYROXINE SODIUM 88 UG/1
88 TABLET ORAL DAILY
Qty: 30 TABLET | Refills: 11 | Status: SHIPPED | OUTPATIENT
Start: 2020-08-24 | End: 2021-08-24 | Stop reason: SDUPTHER

## 2020-08-24 NOTE — PROGRESS NOTES
Virtual Regular Visit      Assessment/Plan:    Problem List Items Addressed This Visit        Endocrine    Hypothyroidism due to Hashimoto's thyroiditis - Primary    Relevant Medications    levothyroxine 88 mcg tablet    Other Relevant Orders    T4, free Lab Collect    TSH, 3rd generation Lab Collect    TSH, 3rd generation Lab Collect    T4, free Lab Collect        Assessment and plan:  Hypothyroidism due to Hashimoto's thyroiditis  Most recent thyroid function tests are normal   She is biochemically and clinically euthyroid  She will continue the same levothyroxine 88 mcg daily  Over the last 2 years, her TSH has slowly decreased and is now down to 0 5  This may be partly due to her weight loss  She is at risk for becoming hyperthyroid and over replaced with thyroid hormone so I will repeat a TSH with free T4 in 6 months and adjust accordingly if needed  I have asked her to follow up in the office in 1 year with preceding TSH and free T4  Reason for visit is   Chief Complaint   Patient presents with    Virtual Regular Visit    and hypothyroid follow-up    Encounter provider Biju Pedersen MD    Provider located at [address] [place] [address] [address] [place] [address]      Recent Visits  No visits were found meeting these conditions  Showing recent visits within past 7 days and meeting all other requirements     Today's Visits  Date Type Provider Dept   08/24/20 Telemedicine Biju Pedersen MD [facility]   Showing today's visits and meeting all other requirements     Future Appointments  No visits were found meeting these conditions  Showing future appointments within next 150 days and meeting all other requirements        The patient was identified by name and date of birth   Wing Armando was informed that this is a telemedicine visit and that the visit is being conducted through telephone  My office door was closed  No one else was in the room  She acknowledged consent and understanding of privacy and security of the video platform  The patient has agreed to participate and understands they can discontinue the visit at any time  Patient is aware this is a billable service  Subjective  Tish Fowler is a 65 y o  female with a history of hypothyroidism due to Hashimoto's thyroiditis for follow-up visit   She was diagnosed with hypothyroidism over 22 years ago  She is currently taking a thyroid hormone for replacement purposes and utilizes levothyroxine 88 mcg daily  She has occasional fatigue and will take a 10 minutes power nap  She has occasional issues with sleeping in that she will wake up and be awake for 2 hours at times but for the most part sleeps well  She is under lot of stress with her  and 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, 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  She denies diarrhea or constipation, anxiety or depression, heat or cold intolerance, palpitation, or tremors  She has chronic dry skin but no brittle nails or hair loss  She denies diplopia  She denies compressive thyroid symptoms or difficulties with swallowing  She has lost about 10 lb since last year and attributes this to frequent nausea and dizziness since she got a new pair of glasses  These symptoms are slowly starting to improve  She is also on meclizine and Zofran as needed and has had a full evaluation other than GI evaluation as yet  She reports that her blood pressure has been on the lower side anywhere from 80-90 over 60s  She has had to discontinue lisinopril and has been following her blood pressures regularly      HPI     Past Medical History:   Diagnosis Date    Asthma     Atrial fibrillation ([address] )     Breast cyst     Disease of thyroid gland     Endometriosis     Fibroid     Gout     Hyperlipidemia     Hypertension     Osteoarthritis     Silent myocardial infarction [address] [address]     Vertigo     at times       Past Surgical History:   Procedure Laterality Date    BREAST CYST ASPIRATION Bilateral     CARDIAC ELECTROPHYSIOLOGY STUDY AND ABLATION      CYST REMOVAL Bilateral     aspiration    HYSTERECTOMY      age 41    OOPHORECTOMY Bilateral     age 41   [facility] HOSPITAL REPLACEMENT TOTAL KNEE BILATERAL Bilateral     TONSILLECTOMY AND ADENOIDECTOMY         Current Outpatient Medications   Medication Sig Dispense Refill    albuterol (PROAIR HFA) 90 mcg/act inhaler 2 puffs as needed      ketoconazole (NIZORAL) 2 % cream APPLY TO THE AFFECTED AREA(S) TWICE DAILY FOR 4 WEEKS  3    levothyroxine 88 mcg tablet Take 1 tablet (88 mcg total) by mouth daily 30 tablet 11    meclizine (ANTIVERT) 12 5 MG tablet 1 tablet as needed      ondansetron (ZOFRAN) 8 mg tablet Take by mouth every 8 (eight) hours as needed for nausea or vomiting      pantoprazole (PROTONIX) 40 mg tablet Take 40 mg by mouth 2 (two) times a day  3    simvastatin (ZOCOR) 20 mg tablet Take 20 mg by mouth daily at bedtime         No current facility-administered medications for this visit  Allergies   Allergen Reactions    Penicillin G      Other reaction(s): anaphylaxis    Erythromycin      Other reaction(s): n/v       Review of Systems   Constitutional: Positive for fatigue and unexpected weight change  Occasional fatigue and 10 min power naps  Lost 10 lbs since last year with nausea  HENT: Negative for trouble swallowing  Eyes: Negative for visual disturbance  No diplopia  Respiratory: Positive for shortness of breath  Negative for chest tightness  Asthma stable  Occasional SOB on awakening in am    Cardiovascular: Negative for chest pain and palpitations  Gastrointestinal: Positive for constipation and nausea  Negative for abdominal pain and diarrhea  Occasional constipation  Endocrine: Negative for cold intolerance and heat intolerance     Skin: Negative for rash         Has chronic dry skin  No brittle nails  No hair loss  Neurological: Positive for dizziness  Negative for tremors, light-headedness and headaches  Dizziness and balance issues with new glasses  Psychiatric/Behavioral: Positive for sleep disturbance  Negative for dysphoric mood  The patient is not nervous/anxious  Under stress at times   health issues and chest pain  Sleeping can be variable, will be awake at times 2 hours if awaken  Video Exam  It was my intent to perform this visit via video technology but the patient was not able to do a video connection so the visit was completed via audio telephone only  Vitals:    08/24/20 0816   Weight: 81 2 kg (179 lb)       Physical Exam was not performed as this was a telephone telemedicine visit  Blood work:  Blood work done on 08/03/2020 showed a TSH of 0 501 with a free T4 1 77  Of note, blood work done on 08/12/2019 showed a TSH of 1 14 and a TSH in December 2018 was 2 2  Free T4 those times was 1 35 and 1 32 respectively  I spent 18 minutes directly with the patient during this visit      Geovanna acknowledges that she has consented to an online visit or consultation  She understands that the online visit is based solely on information provided by her, and that, in the absence of a face-to-face physical evaluation by the physician, the diagnosis she receives is both limited and provisional in terms of accuracy and completeness  This is not intended to replace a full medical face-to-face evaluation by the physician  Jayden Hobbs understands and accepts these terms

## 2020-08-24 NOTE — PATIENT INSTRUCTIONS
The thyroid blood work is normal   You are heading more overactive side  No change levothyroxine 88 mcg daily  Follow up in blood work in 6 months  Follow up in  1 year with blood work

## 2020-09-14 ENCOUNTER — OFFICE VISIT (OUTPATIENT)
Dept: CARDIOLOGY CLINIC | Facility: CLINIC | Age: 70
End: 2020-09-14
Payer: MEDICARE

## 2020-09-14 VITALS
HEART RATE: 78 BPM | WEIGHT: 185.2 LBS | HEIGHT: 65 IN | BODY MASS INDEX: 30.86 KG/M2 | SYSTOLIC BLOOD PRESSURE: 120 MMHG | DIASTOLIC BLOOD PRESSURE: 78 MMHG | TEMPERATURE: 97.7 F

## 2020-09-14 DIAGNOSIS — I47.1 PSVT (PAROXYSMAL SUPRAVENTRICULAR TACHYCARDIA) (HCC): Primary | ICD-10-CM

## 2020-09-14 PROCEDURE — 99214 OFFICE O/P EST MOD 30 MIN: CPT | Performed by: INTERNAL MEDICINE

## 2020-09-14 PROCEDURE — 93000 ELECTROCARDIOGRAM COMPLETE: CPT | Performed by: INTERNAL MEDICINE

## 2020-09-14 NOTE — PROGRESS NOTES
Cardiology Follow Up    Fran Contreras  1950  [id number]  [address] [facility] [address]  [phone]    1  PSVT (paroxysmal supraventricular tachycardia) (HCC)  POCT ECG       Interval History: Followup PSVT    She is doing well  She has no chest pain, no dyspnea and she had one episode of palpitations that subsided spontaneously       Problem List     Hypothyroidism due to Hashimoto's thyroiditis        Past Medical History:   Diagnosis Date    Asthma     Atrial fibrillation ([address] )     Breast cyst     Disease of thyroid gland     Endometriosis     Fibroid     Gout     Hyperlipidemia     Hypertension     Osteoarthritis     Silent myocardial infarction (HCC)     Vertigo     at times     Social History     Socioeconomic History    Marital status: /Civil Union     Spouse name: Not on file    Number of children: Not on file    Years of education: Not on file    Highest education level: Not on file   Occupational History    Occupation: watching [employer]   Social Needs    Financial resource strain: Not on file    Food insecurity     Worry: Not on file     Inability: Not on file   [place] [employer] needs     Medical: Not on file     Non-medical: Not on file   Tobacco Use    Smoking status: Never Smoker    Smokeless tobacco: Never Used   Substance and Sexual Activity    Alcohol use: No    Drug use: No    Sexual activity: Not on file   Lifestyle    Physical activity     Days per week: Not on file     Minutes per session: Not on file    Stress: Not on file   Relationships    Social connections     Talks on phone: Not on file     Gets together: Not on file     Attends Congregation service: Not on file     Active member of club or organization: Not on file     Attends meetings of clubs or organizations: Not on file     Relationship status: Not on file    Intimate partner violence Fear of current or ex partner: Not on file     Emotionally abused: Not on file     Physically abused: Not on file     Forced sexual activity: Not on file   Other Topics Concern    Not on file   Social History Narrative    Not on file      Family History   Problem Relation Age of Onset    Alzheimer's disease Mother     Hypertension Father     Heart attack Father     Liver disease Brother         on O2    No Known Problems Daughter     Obesity Son         post gastric bypass     Past Surgical History:   Procedure Laterality Date    BREAST CYST ASPIRATION Bilateral     CARDIAC ELECTROPHYSIOLOGY STUDY AND ABLATION      CYST REMOVAL Bilateral     aspiration    HYSTERECTOMY      age 41    OOPHORECTOMY Bilateral     age 41   [facility] HOSPITAL REPLACEMENT TOTAL KNEE BILATERAL Bilateral     TONSILLECTOMY AND ADENOIDECTOMY         Current Outpatient Medications:     albuterol (PROAIR HFA) 90 mcg/act inhaler, 2 puffs as needed, Disp: , Rfl:     ketoconazole (NIZORAL) 2 % cream, APPLY TO THE AFFECTED AREA(S) TWICE DAILY FOR 4 WEEKS, Disp: , Rfl: 3    levothyroxine 88 mcg tablet, Take 1 tablet (88 mcg total) by mouth daily, Disp: 30 tablet, Rfl: 11    meclizine (ANTIVERT) 12 5 MG tablet, 1 tablet as needed, Disp: , Rfl:     ondansetron (ZOFRAN) 8 mg tablet, Take by mouth every 8 (eight) hours as needed for nausea or vomiting, Disp: , Rfl:     pantoprazole (PROTONIX) 40 mg tablet, Take 40 mg by mouth 2 (two) times a day, Disp: , Rfl: 3    simvastatin (ZOCOR) 20 mg tablet, Take 20 mg by mouth daily at bedtime  , Disp: , Rfl:   Allergies   Allergen Reactions    Penicillin G      Other reaction(s): anaphylaxis    Erythromycin      Other reaction(s): n/v       Labs:     Chemistry        Component Value Date/Time     05/06/2015 0804    K 4 3 05/19/2020 0719    K 4 6 05/06/2015 0804     05/19/2020 0719     05/06/2015 0804    CO2 26 05/19/2020 0719    CO2 29 05/06/2015 0804    BUN 21 05/19/2020 0719    BUN 20 05/06/2015 0804    CREATININE 1 29 05/19/2020 0719    CREATININE 0 94 05/06/2015 0804        Component Value Date/Time    CALCIUM 8 8 05/19/2020 0719    CALCIUM 8 7 05/06/2015 0804    ALKPHOS 74 10/17/2019 0729    ALKPHOS 80 05/06/2015 0804    AST 20 10/17/2019 0729    AST 21 05/06/2015 0804    ALT 25 10/17/2019 0729    ALT 25 05/06/2015 0804    BILITOT 0 65 05/06/2015 0804            Lab Results   Component Value Date    CHOL 146 11/20/2015    CHOL 169 05/06/2015    CHOL 174 10/03/2014     Lab Results   Component Value Date    HDL 57 10/17/2019    HDL 69 (H) 12/11/2018    HDL 72 (H) 12/12/2017     Lab Results   Component Value Date    LDLCALC 94 10/17/2019    LDLCALC 94 12/11/2018    LDLCALC 100 12/12/2017     Lab Results   Component Value Date    TRIG 68 10/17/2019    TRIG 68 12/11/2018    TRIG 62 12/12/2017     No results found for: CHOLHDL    Imaging: No results found  EKG:NSR LAFB  Richy Horowitz Review of Systems   Constitution: Negative  HENT: Negative  Eyes: Negative  Cardiovascular: Negative  Respiratory: Negative  Endocrine: Negative  Hematologic/Lymphatic: Negative  Skin: Negative  Musculoskeletal: Negative  Gastrointestinal: Negative  Genitourinary: Negative  Neurological: Negative  Psychiatric/Behavioral: Negative  Allergic/Immunologic: Negative  Vitals:    09/14/20 1421   BP: 120/78   Pulse: 78   Temp: 97 7 °F (36 5 °C)           Physical Exam  Vitals signs reviewed  Constitutional:       General: She is not in acute distress  Appearance: Normal appearance  She is not toxic-appearing  HENT:      Head: Normocephalic  Right Ear: There is no impacted cerumen  Left Ear: There is no impacted cerumen  Nose: No congestion or rhinorrhea  Mouth/Throat:      Mouth: Mucous membranes are moist    Eyes:      General: No scleral icterus  Conjunctiva/sclera: Conjunctivae normal    Neck:      Musculoskeletal: Normal range of motion  Cardiovascular:      Rate and Rhythm: Normal rate and regular rhythm  Heart sounds: No murmur  No friction rub  No gallop  Pulmonary:      Effort: Pulmonary effort is normal  No respiratory distress  Breath sounds: No wheezing  Abdominal:      General: Bowel sounds are normal  There is no distension  Palpations: Abdomen is soft  Tenderness: There is no abdominal tenderness  There is no guarding  Musculoskeletal: Normal range of motion  General: No swelling, tenderness or deformity  Left lower leg: No edema  Skin:     General: Skin is warm and dry  Neurological:      General: No focal deficit present  Mental Status: She is alert and oriented to person, place, and time  Psychiatric:         Mood and Affect: Mood normal          Behavior: Behavior normal          Discussion/Summary:    Chest Pain: She has had no recurrence  Her stress test was normal last year       Hypertension: Her BP was low and now off lisinopril she has been okay       PSVT: She is in normal sinus rhythm today  Hx of SVT ablation in 1998           The patient was counseled regarding diagnostic results, instructions for management, risk factor reductions, impressions  total time of encounter was 25 minutes and 15 minutes was spent counseling

## 2020-10-05 ENCOUNTER — ANNUAL EXAM (OUTPATIENT)
Dept: OBGYN CLINIC | Facility: CLINIC | Age: 70
End: 2020-10-05
Payer: MEDICARE

## 2020-10-05 VITALS
WEIGHT: 185 LBS | SYSTOLIC BLOOD PRESSURE: 118 MMHG | BODY MASS INDEX: 31.58 KG/M2 | DIASTOLIC BLOOD PRESSURE: 82 MMHG | TEMPERATURE: 97.1 F | HEIGHT: 64 IN

## 2020-10-05 DIAGNOSIS — Z01.419 ENCOUNTER FOR ANNUAL ROUTINE GYNECOLOGICAL EXAMINATION: Primary | ICD-10-CM

## 2020-10-05 DIAGNOSIS — Z78.0 ASYMPTOMATIC MENOPAUSE: ICD-10-CM

## 2020-10-05 DIAGNOSIS — Z12.31 ENCOUNTER FOR SCREENING MAMMOGRAM FOR BREAST CANCER: ICD-10-CM

## 2020-10-05 PROCEDURE — G0101 CA SCREEN;PELVIC/BREAST EXAM: HCPCS | Performed by: OBSTETRICS & GYNECOLOGY

## 2020-10-27 ENCOUNTER — TRANSCRIBE ORDERS (OUTPATIENT)
Dept: ADMINISTRATIVE | Facility: HOSPITAL | Age: 70
End: 2020-10-27

## 2020-10-27 ENCOUNTER — LAB (OUTPATIENT)
Dept: LAB | Facility: CLINIC | Age: 70
End: 2020-10-27
Payer: MEDICARE

## 2020-10-27 DIAGNOSIS — Z00.00 ROUTINE GENERAL MEDICAL EXAMINATION AT A HEALTH CARE FACILITY: ICD-10-CM

## 2020-10-27 DIAGNOSIS — I10 HYPERTENSION, ESSENTIAL: ICD-10-CM

## 2020-10-27 DIAGNOSIS — K86.2 CYST AND PSEUDOCYST OF PANCREAS: ICD-10-CM

## 2020-10-27 DIAGNOSIS — K86.3 CYST AND PSEUDOCYST OF PANCREAS: ICD-10-CM

## 2020-10-27 DIAGNOSIS — R63.8 EFFECTS OF THIRST: ICD-10-CM

## 2020-10-27 DIAGNOSIS — Z23 NEED FOR PROPHYLACTIC VACCINATION AND INOCULATION AGAINST CHOLERA ALONE: Primary | ICD-10-CM

## 2020-10-27 DIAGNOSIS — K29.00 ACUTE GASTRITIS, PRESENCE OF BLEEDING UNSPECIFIED, UNSPECIFIED GASTRITIS TYPE: ICD-10-CM

## 2020-10-27 DIAGNOSIS — Z23 NEED FOR PROPHYLACTIC VACCINATION AND INOCULATION AGAINST CHOLERA ALONE: ICD-10-CM

## 2020-10-27 LAB
ALBUMIN SERPL BCP-MCNC: 3.8 G/DL (ref 3.5–5)
ALP SERPL-CCNC: 80 U/L (ref 46–116)
ALT SERPL W P-5'-P-CCNC: 18 U/L (ref 12–78)
ANION GAP SERPL CALCULATED.3IONS-SCNC: 4 MMOL/L (ref 4–13)
AST SERPL W P-5'-P-CCNC: 16 U/L (ref 5–45)
BILIRUB SERPL-MCNC: 0.94 MG/DL (ref 0.2–1)
BUN SERPL-MCNC: 21 MG/DL (ref 5–25)
CALCIUM SERPL-MCNC: 9.7 MG/DL (ref 8.3–10.1)
CHLORIDE SERPL-SCNC: 108 MMOL/L (ref 100–108)
CHOLEST SERPL-MCNC: 178 MG/DL (ref 50–200)
CO2 SERPL-SCNC: 27 MMOL/L (ref 21–32)
CREAT SERPL-MCNC: 1.16 MG/DL (ref 0.6–1.3)
GFR SERPL CREATININE-BSD FRML MDRD: 47 ML/MIN/1.73SQ M
GLUCOSE P FAST SERPL-MCNC: 88 MG/DL (ref 65–99)
HDLC SERPL-MCNC: 75 MG/DL
LDLC SERPL CALC-MCNC: 90 MG/DL (ref 0–100)
NONHDLC SERPL-MCNC: 103 MG/DL
POTASSIUM SERPL-SCNC: 4.5 MMOL/L (ref 3.5–5.3)
PROT SERPL-MCNC: 8.1 G/DL (ref 6.4–8.2)
SODIUM SERPL-SCNC: 139 MMOL/L (ref 136–145)
TRIGL SERPL-MCNC: 63 MG/DL

## 2020-10-27 PROCEDURE — 36415 COLL VENOUS BLD VENIPUNCTURE: CPT

## 2020-10-27 PROCEDURE — 80053 COMPREHEN METABOLIC PANEL: CPT

## 2020-10-27 PROCEDURE — 80061 LIPID PANEL: CPT

## 2021-02-08 ENCOUNTER — APPOINTMENT (OUTPATIENT)
Dept: LAB | Facility: CLINIC | Age: 71
End: 2021-02-08
Payer: MEDICARE

## 2021-02-08 DIAGNOSIS — E03.8 HYPOTHYROIDISM DUE TO HASHIMOTO'S THYROIDITIS: ICD-10-CM

## 2021-02-08 DIAGNOSIS — E06.3 HYPOTHYROIDISM DUE TO HASHIMOTO'S THYROIDITIS: ICD-10-CM

## 2021-02-08 LAB
T4 FREE SERPL-MCNC: 1.42 NG/DL (ref 0.76–1.46)
TSH SERPL DL<=0.05 MIU/L-ACNC: 1.92 UIU/ML (ref 0.36–3.74)

## 2021-02-08 PROCEDURE — 36415 COLL VENOUS BLD VENIPUNCTURE: CPT

## 2021-02-08 PROCEDURE — 84439 ASSAY OF FREE THYROXINE: CPT

## 2021-02-08 PROCEDURE — 84443 ASSAY THYROID STIM HORMONE: CPT

## 2021-03-03 DIAGNOSIS — Z23 ENCOUNTER FOR IMMUNIZATION: ICD-10-CM

## 2021-06-17 ENCOUNTER — HOSPITAL ENCOUNTER (OUTPATIENT)
Dept: MAMMOGRAPHY | Facility: CLINIC | Age: 71
Discharge: HOME | End: 2021-06-17
Payer: MEDICARE

## 2021-06-17 ENCOUNTER — HOSPITAL ENCOUNTER (OUTPATIENT)
Dept: BONE DENSITY | Facility: CLINIC | Age: 71
Discharge: HOME | End: 2021-06-17
Payer: MEDICARE

## 2021-06-17 VITALS — HEIGHT: 64 IN | BODY MASS INDEX: 31.58 KG/M2 | WEIGHT: 185 LBS

## 2021-06-17 DIAGNOSIS — Z78.0 ASYMPTOMATIC MENOPAUSE: ICD-10-CM

## 2021-06-17 DIAGNOSIS — Z12.31 ENCOUNTER FOR SCREENING MAMMOGRAM FOR BREAST CANCER: ICD-10-CM

## 2021-06-17 PROCEDURE — 77080 DXA BONE DENSITY AXIAL: CPT

## 2021-06-17 PROCEDURE — 77067 SCR MAMMO BI INCL CAD: CPT

## 2021-06-17 PROCEDURE — 77063 BREAST TOMOSYNTHESIS BI: CPT

## 2021-07-29 ENCOUNTER — HOSPITAL ENCOUNTER (EMERGENCY)
Facility: HOSPITAL | Age: 71
Discharge: HOME | End: 2021-07-29
Attending: EMERGENCY MEDICINE | Admitting: EMERGENCY MEDICINE
Payer: MEDICARE

## 2021-07-29 ENCOUNTER — APPOINTMENT (EMERGENCY)
Dept: RADIOLOGY | Facility: HOSPITAL | Age: 71
End: 2021-07-29
Payer: MEDICARE

## 2021-07-29 VITALS
DIASTOLIC BLOOD PRESSURE: 73 MMHG | RESPIRATION RATE: 19 BRPM | SYSTOLIC BLOOD PRESSURE: 159 MMHG | OXYGEN SATURATION: 99 % | BODY MASS INDEX: 31.58 KG/M2 | HEIGHT: 64 IN | HEART RATE: 69 BPM | WEIGHT: 185 LBS | TEMPERATURE: 98.5 F

## 2021-07-29 DIAGNOSIS — R06.02 SHORTNESS OF BREATH: ICD-10-CM

## 2021-07-29 DIAGNOSIS — R07.9 CHEST PAIN, UNSPECIFIED: Primary | ICD-10-CM

## 2021-07-29 LAB
ALBUMIN SERPL BCP-MCNC: 3.2 G/DL (ref 3.5–5)
ALP SERPL-CCNC: 71 U/L (ref 46–116)
ALT SERPL W P-5'-P-CCNC: 21 U/L (ref 12–78)
ANION GAP SERPL CALCULATED.3IONS-SCNC: 7 MMOL/L (ref 4–13)
AST SERPL W P-5'-P-CCNC: 13 U/L (ref 5–45)
BASOPHILS # BLD AUTO: 0.06 THOUSANDS/ΜL (ref 0–0.1)
BASOPHILS NFR BLD AUTO: 1 % (ref 0–1)
BILIRUB SERPL-MCNC: 0.7 MG/DL (ref 0.2–1)
BUN SERPL-MCNC: 19 MG/DL (ref 5–25)
CALCIUM ALBUM COR SERPL-MCNC: 8.9 MG/DL (ref 8.3–10.1)
CALCIUM SERPL-MCNC: 8.3 MG/DL (ref 8.3–10.1)
CHLORIDE SERPL-SCNC: 106 MMOL/L (ref 100–108)
CO2 SERPL-SCNC: 27 MMOL/L (ref 21–32)
CREAT SERPL-MCNC: 1.08 MG/DL (ref 0.6–1.3)
EOSINOPHIL # BLD AUTO: 0.15 THOUSAND/ΜL (ref 0–0.61)
EOSINOPHIL NFR BLD AUTO: 2 % (ref 0–6)
ERYTHROCYTE [DISTWIDTH] IN BLOOD BY AUTOMATED COUNT: 12.3 % (ref 11.6–15.1)
GFR SERPL CREATININE-BSD FRML MDRD: 51 ML/MIN/1.73SQ M
GLUCOSE SERPL-MCNC: 96 MG/DL (ref 65–140)
HCT VFR BLD AUTO: 38.5 % (ref 34.8–46.1)
HGB BLD-MCNC: 12.4 G/DL (ref 11.5–15.4)
IMM GRANULOCYTES # BLD AUTO: 0.03 THOUSAND/UL (ref 0–0.2)
IMM GRANULOCYTES NFR BLD AUTO: 1 % (ref 0–2)
LYMPHOCYTES # BLD AUTO: 1.74 THOUSANDS/ΜL (ref 0.6–4.47)
LYMPHOCYTES NFR BLD AUTO: 27 % (ref 14–44)
MCH RBC QN AUTO: 31.2 PG (ref 26.8–34.3)
MCHC RBC AUTO-ENTMCNC: 32.2 G/DL (ref 31.4–37.4)
MCV RBC AUTO: 97 FL (ref 82–98)
MONOCYTES # BLD AUTO: 0.54 THOUSAND/ΜL (ref 0.17–1.22)
MONOCYTES NFR BLD AUTO: 9 % (ref 4–12)
NEUTROPHILS # BLD AUTO: 3.85 THOUSANDS/ΜL (ref 1.85–7.62)
NEUTS SEG NFR BLD AUTO: 60 % (ref 43–75)
NRBC BLD AUTO-RTO: 0 /100 WBCS
NT-PROBNP SERPL-MCNC: 345 PG/ML
PLATELET # BLD AUTO: 221 THOUSANDS/UL (ref 149–390)
PMV BLD AUTO: 9.9 FL (ref 8.9–12.7)
POTASSIUM SERPL-SCNC: 3.9 MMOL/L (ref 3.5–5.3)
PROT SERPL-MCNC: 7.1 G/DL (ref 6.4–8.2)
RBC # BLD AUTO: 3.97 MILLION/UL (ref 3.81–5.12)
SODIUM SERPL-SCNC: 140 MMOL/L (ref 136–145)
TROPONIN I SERPL-MCNC: <0.02 NG/ML
WBC # BLD AUTO: 6.37 THOUSAND/UL (ref 4.31–10.16)

## 2021-07-29 PROCEDURE — 83880 ASSAY OF NATRIURETIC PEPTIDE: CPT | Performed by: PHYSICIAN ASSISTANT

## 2021-07-29 PROCEDURE — 71045 X-RAY EXAM CHEST 1 VIEW: CPT

## 2021-07-29 PROCEDURE — 36415 COLL VENOUS BLD VENIPUNCTURE: CPT | Performed by: EMERGENCY MEDICINE

## 2021-07-29 PROCEDURE — 93005 ELECTROCARDIOGRAM TRACING: CPT

## 2021-07-29 PROCEDURE — 84484 ASSAY OF TROPONIN QUANT: CPT | Performed by: EMERGENCY MEDICINE

## 2021-07-29 PROCEDURE — 99284 EMERGENCY DEPT VISIT MOD MDM: CPT | Performed by: PHYSICIAN ASSISTANT

## 2021-07-29 PROCEDURE — 80053 COMPREHEN METABOLIC PANEL: CPT | Performed by: EMERGENCY MEDICINE

## 2021-07-29 PROCEDURE — 99285 EMERGENCY DEPT VISIT HI MDM: CPT

## 2021-07-29 PROCEDURE — 85025 COMPLETE CBC W/AUTO DIFF WBC: CPT | Performed by: EMERGENCY MEDICINE

## 2021-07-29 NOTE — ED PROVIDER NOTES
History  Chief Complaint   Patient presents with    Chest Pain     Pt arrives with SOB/CP since she took 8 doses of antivert since   Patient is a 67 y/o F with h/o asthma, a-fib -resolved after ablation that presents to the ED with chest pressure and SOB that started 2 days ago  She states she had heart palpitations a couple weeks ago  She checked her pulse and it was 106, but felt regular  She states it lasted about 5 minutes and resolved  She states she had vertigo over the weekend and took antivert and it resolved  She is currently asymptomatic  SHe has been using her inhaler for the SOB which helps  No fevers, chills, or cough  No leg pain or swelling  No long trips or recent surgeries  History provided by:  Patient  Chest Pain  Pain location:  Substernal area  Pain quality: pressure    Pain radiates to:  Does not radiate  Pain radiates to the back: no    Pain severity:  Moderate  Onset quality:  Gradual  Duration:  2 days  Timing:  Constant  Progression:  Unchanged  Chronicity:  New  Context: at rest    Relieved by: inhaler  Worsened by:  Nothing tried  Associated symptoms: dizziness, palpitations and shortness of breath    Associated symptoms: no abdominal pain, no altered mental status, no anxiety, no back pain, no cough, no fever, no headache, no lower extremity edema, no nausea, no numbness and no weakness    Risk factors: high cholesterol and hypertension    Risk factors: no coronary artery disease, no diabetes mellitus, not obese, no prior DVT/PE, no smoking and no surgery        Prior to Admission Medications   Prescriptions Last Dose Informant Patient Reported? Taking?    albuterol (PROAIR HFA) 90 mcg/act inhaler  Self Yes No   Si puffs as needed   ketoconazole (NIZORAL) 2 % cream  Self Yes No   Sig: APPLY TO THE AFFECTED AREA(S) TWICE DAILY FOR 4 WEEKS   levothyroxine 88 mcg tablet  Self No No   Sig: Take 1 tablet (88 mcg total) by mouth daily   meclizine (ANTIVERT) 12 5 MG tablet  Self Yes No   Si tablet as needed   ondansetron (ZOFRAN) 8 mg tablet  Self Yes No   Sig: Take by mouth every 8 (eight) hours as needed for nausea or vomiting   pantoprazole (PROTONIX) 40 mg tablet  Self Yes No   Sig: Take 40 mg by mouth 2 (two) times a day   simvastatin (ZOCOR) 20 mg tablet  Self Yes No   Sig: Take 20 mg by mouth daily at bedtime        Facility-Administered Medications: None       Past Medical History:   Diagnosis Date    Asthma     Atrial fibrillation (HCC)     Breast cyst     Disease of thyroid gland     Endometriosis     Fibroid     Gout     Hyperlipidemia     Hypertension     Osteoarthritis     Silent myocardial infarction ([address] )     Vertigo     at times       Past Surgical History:   Procedure Laterality Date    BREAST CYST ASPIRATION Bilateral     CARDIAC ELECTROPHYSIOLOGY STUDY AND ABLATION      CYST REMOVAL Bilateral     aspiration    HYSTERECTOMY      age 41    OOPHORECTOMY Bilateral     age 41   Areta Emmer REPLACEMENT TOTAL KNEE BILATERAL Bilateral     TONSILLECTOMY AND ADENOIDECTOMY         Family History   Problem Relation Age of Onset    Alzheimer's disease Mother     Hypertension Father     Heart attack Father     Liver disease Brother         on O2    No Known Problems Daughter     Obesity Son         post gastric bypass     I have reviewed and agree with the history as documented  E-Cigarette/Vaping    E-Cigarette Use Never User      E-Cigarette/Vaping Substances    Nicotine No     THC No     CBD No     Flavoring No     Other No     Unknown No      Social History     Tobacco Use    Smoking status: Never Smoker    Smokeless tobacco: Never Used   Vaping Use    Vaping Use: Never used   Substance Use Topics    Alcohol use: No    Drug use: No       Review of Systems   Constitutional: Negative for chills and fever  HENT: Negative  Respiratory: Positive for shortness of breath  Negative for cough and wheezing      Cardiovascular: Positive for chest pain and palpitations  Negative for leg swelling  Gastrointestinal: Negative for abdominal pain and nausea  Musculoskeletal: Negative for back pain  Skin: Negative for color change and rash  Neurological: Positive for dizziness  Negative for syncope, facial asymmetry, speech difficulty, weakness, light-headedness, numbness and headaches  Psychiatric/Behavioral: Negative for confusion  All other systems reviewed and are negative  Physical Exam  Physical Exam  Nursing note reviewed  Constitutional:       General: She is not in acute distress  Appearance: Normal appearance  She is well-developed, well-groomed and normal weight  She is not ill-appearing or diaphoretic  HENT:      Head: Normocephalic and atraumatic  Right Ear: External ear normal       Left Ear: External ear normal       Nose: Nose normal    Eyes:      Conjunctiva/sclera: Conjunctivae normal    Cardiovascular:      Rate and Rhythm: Normal rate and regular rhythm  Heart sounds: Normal heart sounds  Pulmonary:      Effort: Pulmonary effort is normal       Breath sounds: Normal breath sounds  No wheezing, rhonchi or rales  Abdominal:      General: Abdomen is flat  Bowel sounds are normal       Palpations: Abdomen is soft  Tenderness: There is no abdominal tenderness  Musculoskeletal:         General: Normal range of motion  Cervical back: Normal range of motion  Right lower leg: No edema  Left lower leg: No edema  Skin:     General: Skin is warm and dry  Coloration: Skin is not pale  Findings: No rash  Neurological:      General: No focal deficit present  Mental Status: She is alert and oriented to person, place, and time  Psychiatric:         Mood and Affect: Mood normal          Behavior: Behavior is cooperative           Vital Signs  ED Triage Vitals   Temperature Pulse Respirations Blood Pressure SpO2   07/29/21 1500 07/29/21 1500 07/29/21 1500 07/29/21 1515 07/29/21 1500   98 5 °F (36 9 °C) 89 16 150/78 98 %      Temp Source Heart Rate Source Patient Position - Orthostatic VS BP Location FiO2 (%)   07/29/21 1500 07/29/21 1500 07/29/21 1500 07/29/21 1500 --   Temporal Monitor Sitting Left arm       Pain Score       07/29/21 1500       4           Vitals:    07/29/21 1630 07/29/21 1645 07/29/21 1700 07/29/21 1715   BP: 159/73 156/72 151/66 159/73   Pulse: 70 68 69 69   Patient Position - Orthostatic VS: Sitting Sitting           Visual Acuity      ED Medications  Medications - No data to display    Diagnostic Studies  Results Reviewed     Procedure Component Value Units Date/Time    Comprehensive metabolic panel [[id number]]  (Abnormal) Collected: 07/29/21 1625    Lab Status: Final result Specimen: Blood from Arm, Right Updated: 07/29/21 1706     Sodium 140 mmol/L      Potassium 3 9 mmol/L      Chloride 106 mmol/L      CO2 27 mmol/L      ANION GAP 7 mmol/L      BUN 19 mg/dL      Creatinine 1 08 mg/dL      Glucose 96 mg/dL      Calcium 8 3 mg/dL      Corrected Calcium 8 9 mg/dL      AST 13 U/L      ALT 21 U/L      Alkaline Phosphatase 71 U/L      Total Protein 7 1 g/dL      Albumin 3 2 g/dL      Total Bilirubin 0 70 mg/dL      eGFR 51 ml/min/1 73sq m     Narrative:      [place] guidelines for Chronic Kidney Disease (CKD):     Stage 1 with normal or high GFR (GFR > 90 mL/min/1 73 square meters)    Stage 2 Mild CKD (GFR = 60-89 mL/min/1 73 square meters)    Stage 3A Moderate CKD (GFR = 45-59 mL/min/1 73 square meters)    Stage 3B Moderate CKD (GFR = 30-44 mL/min/1 73 square meters)    Stage 4 Severe CKD (GFR = 15-29 mL/min/1 73 square meters)    Stage 5 End Stage CKD (GFR <15 mL/min/1 73 square meters)  Note: GFR calculation is accurate only with a steady state creatinine    NT-BNP PRO [[id number]]  (Abnormal) Collected: 07/29/21 1625    Lab Status: Final result Specimen: Blood from Arm, Right Updated: 07/29/21 1657     NT-proBNP 345 pg/mL     Troponin I [[id number]]  (Normal) Collected: 07/29/21 1502    Lab Status: Final result Specimen: Blood from Arm, Right Updated: 07/29/21 1645     Troponin I <0 02 ng/mL     CBC and differential [[id number]] Collected: 07/29/21 1505    Lab Status: Final result Specimen: Blood from Arm, Right Updated: 07/29/21 1520     WBC 6 37 Thousand/uL      RBC 3 97 Million/uL      Hemoglobin 12 4 g/dL      Hematocrit 38 5 %      MCV 97 fL      MCH 31 2 pg      MCHC 32 2 g/dL      RDW 12 3 %      MPV 9 9 fL      Platelets 983 Thousands/uL      nRBC 0 /100 WBCs      Neutrophils Relative 60 %      Immat GRANS % 1 %      Lymphocytes Relative 27 %      Monocytes Relative 9 %      Eosinophils Relative 2 %      Basophils Relative 1 %      Neutrophils Absolute 3 85 Thousands/µL      Immature Grans Absolute 0 03 Thousand/uL      Lymphocytes Absolute 1 74 Thousands/µL      Monocytes Absolute 0 54 Thousand/µL      Eosinophils Absolute 0 15 Thousand/µL      Basophils Absolute 0 06 Thousands/µL                  XR chest 1 view portable   Final Result by José Donohue MD (07/29 1651)      No acute disease in the chest                   Workstation performed: [id number]                    Procedures  ECG 12 Lead Documentation Only    Date/Time: 7/29/2021 3:07 PM  Performed by: Zahra Osorio PA-C  Authorized by: Zahra Osorio PA-C     Indications / Diagnosis:  Chest pain  ECG reviewed by me, the ED Provider: yes    Patient location:  ED  Previous ECG:     Previous ECG:  Compared to current    Similarity:  No change  Rate:     ECG rate:  80  Rhythm:     Rhythm: sinus rhythm    Conduction:     Conduction: abnormal      Abnormal conduction: incomplete RBBB    ST segments:     ST segments:  Normal             ED Course             HEART Risk Score      Most Recent Value   Heart Score Risk Calculator   History  0 Filed at: 07/29/2021 1710   ECG  0 Filed at: 07/29/2021 1710   Age  2 Filed at: 07/29/2021 1710   Risk Factors  1 Filed at: 07/29/2021 1710   Troponin  0 Filed at: 07/29/2021 1710   HEART Score  3 Filed at: 07/29/2021 1710                      SBIRT 22yo+      Most Recent Value   SBIRT (23 yo +)   In order to provide better care to our patients, we are screening all of our patients for alcohol and drug use  Would it be okay to ask you these screening questions? Yes Filed at: 07/29/2021 1506   Initial Alcohol Screen: US AUDIT-C    1  How often do you have a drink containing alcohol?  0 Filed at: 07/29/2021 1506   2  How many drinks containing alcohol do you have on a typical day you are drinking? 0 Filed at: 07/29/2021 1506   3a  Male UNDER 65: How often do you have five or more drinks on one occasion? 0 Filed at: 07/29/2021 1506   3b  FEMALE Any Age, or MALE 65+: How often do you have 4 or more drinks on one occassion? 0 Filed at: 07/29/2021 1506   Audit-C Score  0 Filed at: 07/29/2021 1506   BEATRIZ: How many times in the past year have you    Used an illegal drug or used a prescription medication for non-medical reasons? Never Filed at: 07/29/2021 1506                    MDM  Number of Diagnoses or Management Options  Chest pain, unspecified: new and requires workup  Shortness of breath: new and requires workup  Diagnosis management comments: Patient with chest pain and SOB x 2 days, will check labs, EKG, CXR to r/o cardiopulmonary disease  No abnormalities on ekg or labs, patient has had this from meclizine in the past   She does have a f/u appt with cardiologist in August, but advised her to f/u in 1-2 days  REturn precautions given          Amount and/or Complexity of Data Reviewed  Clinical lab tests: ordered and reviewed  Tests in the radiology section of CPT®: ordered and reviewed    Patient Progress  Patient progress: stable      Disposition  Final diagnoses:   Chest pain, unspecified   Shortness of breath     Time reflects when diagnosis was documented in both MDM as applicable and the Disposition within this note     Time User Action Codes Description Comment    7/29/2021  5:16 PM Randall Zheng Add [R07 9] Chest pain, unspecified     7/29/2021  5:17 PM Randall Zheng Add [R06 02] Shortness of breath       ED Disposition     ED Disposition Condition Date/Time Comment    Discharge Stable u Jul 29, 2021  5:16 PM Norma Cisneros discharge to home/self care  Follow-up Information     Follow up With Specialties Details Why Jesus Diaz MD Cardiology Schedule an appointment as soon as possible for a visit  For recheck [address] [place] [id number]  [phone]            Discharge Medication List as of 7/29/2021  5:18 PM      CONTINUE these medications which have NOT CHANGED    Details   albuterol (PROAIR HFA) 90 mcg/act inhaler 2 puffs as needed, Historical Med      ketoconazole (NIZORAL) 2 % cream APPLY TO THE AFFECTED AREA(S) TWICE DAILY FOR 4 WEEKS, Historical Med      levothyroxine 88 mcg tablet Take 1 tablet (88 mcg total) by mouth daily, Starting Mon 8/24/2020, Normal      meclizine (ANTIVERT) 12 5 MG tablet 1 tablet as needed, Historical Med      ondansetron (ZOFRAN) 8 mg tablet Take by mouth every 8 (eight) hours as needed for nausea or vomiting, Historical Med      pantoprazole (PROTONIX) 40 mg tablet Take 40 mg by mouth 2 (two) times a day, Starting Tue 4/30/2019, Historical Med      simvastatin (ZOCOR) 20 mg tablet Take 20 mg by mouth daily at bedtime  , Historical Med           No discharge procedures on file      PDMP Review     None          ED Provider  Electronically Signed by           Dave Aaron PA-C  07/29/21 3551

## 2021-07-29 NOTE — DISCHARGE INSTRUCTIONS
Rest, continue inhaler as needed  Follow up with family doctor in 1-2 days for recheck  Follow up with your cardiologist for recheck  Return to ER if pain or shortness of breath worsens

## 2021-07-30 LAB
ATRIAL RATE: 80 BPM
P AXIS: 74 DEGREES
PR INTERVAL: 170 MS
QRS AXIS: -67 DEGREES
QRSD INTERVAL: 100 MS
QT INTERVAL: 402 MS
QTC INTERVAL: 463 MS
T WAVE AXIS: 75 DEGREES
VENTRICULAR RATE: 80 BPM

## 2021-07-30 PROCEDURE — 93010 ELECTROCARDIOGRAM REPORT: CPT | Performed by: INTERNAL MEDICINE

## 2021-08-02 ENCOUNTER — LAB (OUTPATIENT)
Dept: LAB | Facility: CLINIC | Age: 71
End: 2021-08-02
Payer: MEDICARE

## 2021-08-02 DIAGNOSIS — E03.8 HYPOTHYROIDISM DUE TO HASHIMOTO'S THYROIDITIS: ICD-10-CM

## 2021-08-02 DIAGNOSIS — E06.3 HYPOTHYROIDISM DUE TO HASHIMOTO'S THYROIDITIS: ICD-10-CM

## 2021-08-02 LAB
T4 FREE SERPL-MCNC: 1.49 NG/DL (ref 0.76–1.46)
TSH SERPL DL<=0.05 MIU/L-ACNC: 1.47 UIU/ML (ref 0.36–3.74)

## 2021-08-02 PROCEDURE — 36415 COLL VENOUS BLD VENIPUNCTURE: CPT

## 2021-08-02 PROCEDURE — 84443 ASSAY THYROID STIM HORMONE: CPT

## 2021-08-02 PROCEDURE — 84439 ASSAY OF FREE THYROXINE: CPT

## 2021-08-14 NOTE — PROGRESS NOTES
Cardiology  ER Follow Up   Office Visit Note  Carlito Ayala   66 y o    female   MRN: [id number]  [facility]  [address] [facility]  [address] [facility] [address]  [phone]    PCP: Casandra Ramires MD  Cardiologist: Dr Steve Alejandro            Summary of recommendations  Heart healthy diet  Educational information provided  2 week Zio patch to assess for recurrent PSVT  Echocardiogram  Decrease caffeine- drinking 22 oz caffeine a day  Follow up will be scheduled with Dr Steve Alejandro 6 weeks        Assessment/plan  Chest pain and shortness of breath  Recent ED adm 7/29/21  Workup unremarkable  TSH normal  She had a normal stress test last year  History suggest possible recurrence of AFib  Will order a 2 week event recorder, check an echocardiogram   I recommend she decrease her caffeine  She has been having a lot of personal stress of recent  PSVT/ a fib  History of SVT ablation 1998 [place]  Hyperlipidemia, on simvastatin 20 mg daily  October 2020:  LDL 90, non , at goal  Hx HTN  /72  She had been on lisinopril 5 mg/g-- this was discontinued  Recommend periodic home blood pressure monitoring   Vertigo meclizine p r n  Asthma   GERD   Hypothyroidism secondary to Hashimoto's  TSH WNL 8/2/21  Cardiac testing  · NM myocardial SPECT 5/10/19  Pharmacological  Normal study after pharmacologic stress without reproduction of symptoms  Myocardial perfusion imaging was normal at rest and with stress  Left ventricular systolic function was normal The calculated left ventricular ejection fraction was 70 %  Left ventricular ejection fraction was within normal limits by visual estimate  There was no left ventricular regional abnormality                   HPI  Cassandra Juarez is a  67 yo female with PSVT, dyslipidemia, GERD and asthma  She had an SVT ablation in 1998  She follows with Dr Steve Alejandro  7/31/21, ED visit  CP and SOB, following several doses of Antivert for dizziness  Of note she checked her pulse and it was 106 blood per the patient felt regular  Lasted 5 minutes and resolved  Using her inhaler for asthma which seemed to help    8/17/21  Hospital F/U  She tells me in July she had a lot of personal social stressors  For about 9 days in a row she felt a lot of recurrent palpitations that was similar to her prior AFib  In the last few weeks she does not think she has had any  However, she does occasionally feel some chest pressure  It is not related to exertion  She does have dyspnea on exertion  In the ED, her proBNP was 345  She is not currently on any AV lit agents nor anticoagulation  She tells me she was never on anticoagulation  Clinically, I do not detect any rales  She has some mild lower extremity edema  /72  HR 69    Social history:  Besides a lot of her personal stress, she has been drinking a fair amount of caffeine, 22 oz a day    I recommend she cut her caffeine in half   She is agreeable to a 2 week event recorder  Will also get an updated echocardiogram   She had a stress test just 2 years ago that showed no ischemia  She will return to see her cardiologist in short interval      I have spent  25minutes with Patient  today in which greater than 50% of this time was spent in counseling/coordination of care regarding Intructions for management, Patient and family education, Importance of tx compliance and Risk factor reductions  Assessment  Diagnoses and all orders for this visit:    Chest pain, unspecified type  -     Echo complete with contrast if indicated; Future    SOB (shortness of breath)  -     Echo complete with contrast if indicated;  Future    PSVT (paroxysmal supraventricular tachycardia) (HCC)    Pure hypercholesterolemia    Vertigo          Past Medical History:   Diagnosis Date    Asthma     Atrial fibrillation ([address] )     Breast cyst     Disease of thyroid gland     Endometriosis     Fibroid     Gout     Hyperlipidemia  Hypertension     Osteoarthritis     Silent myocardial infarction (HCC)     Vertigo     at times       Review of Systems   Constitutional: Negative for chills  Cardiovascular: Negative for chest pain, claudication, cyanosis, dyspnea on exertion, irregular heartbeat, leg swelling, near-syncope, orthopnea, palpitations, paroxysmal nocturnal dyspnea and syncope  Respiratory: Negative for cough and shortness of breath  Gastrointestinal: Negative for heartburn and nausea  Neurological: Negative for dizziness, focal weakness, headaches, light-headedness and weakness  All other systems reviewed and are negative        Allergies   Allergen Reactions    Penicillin G      Other reaction(s): anaphylaxis    Erythromycin      Other reaction(s): n/v          Current Outpatient Medications:     albuterol (PROAIR HFA) 90 mcg/act inhaler, 2 puffs as needed, Disp: , Rfl:     ketoconazole (NIZORAL) 2 % cream, APPLY TO THE AFFECTED AREA(S) TWICE DAILY FOR 4 WEEKS, Disp: , Rfl: 3    levothyroxine 88 mcg tablet, Take 1 tablet (88 mcg total) by mouth daily, Disp: 30 tablet, Rfl: 11    meclizine (ANTIVERT) 12 5 MG tablet, 1 tablet as needed, Disp: , Rfl:     ondansetron (ZOFRAN) 8 mg tablet, Take by mouth every 8 (eight) hours as needed for nausea or vomiting, Disp: , Rfl:     pantoprazole (PROTONIX) 40 mg tablet, Take 40 mg by mouth 2 (two) times a day, Disp: , Rfl: 3    simvastatin (ZOCOR) 20 mg tablet, Take 20 mg by mouth daily at bedtime  , Disp: , Rfl:         Social History     Socioeconomic History    Marital status: /Civil Union     Spouse name: Not on file    Number of children: Not on file    Years of education: Not on file    Highest education level: Not on file   Occupational History    Occupation: [employer]   Tobacco Use    Smoking status: Never Smoker    Smokeless tobacco: Never Used   Vaping Use    Vaping Use: Never used   Substance and Sexual Activity    Alcohol use: No    Drug use: No    Sexual activity: Not Currently   Other Topics Concern    Not on file   Social History Narrative    Not on file     Social Determinants of Health     Financial Resource Strain:     Difficulty of Paying Living Expenses:    Food Insecurity:     Worried About Running Out of Food in the Last Year:     [address] in the Last Year:    Transportation Needs:     Lack of Transportation (Medical):  Lack of Transportation (Non-Medical):    Physical Activity:     Days of Exercise per Week:     Minutes of Exercise per Session:    Stress:     Feeling of Stress :    Social Connections:     Frequency of Communication with Friends and Family:     Frequency of Social Gatherings with Friends and Family:     Attends [employer] Services:     Active Member of Clubs or Organizations:     Attends Club or Organization Meetings:     Marital Status:    Intimate Partner Violence:     Fear of Current or Ex-Partner:     Emotionally Abused:     Physically Abused:     Sexually Abused:        Family History   Problem Relation Age of Onset    Alzheimer's disease Mother     Hypertension Father     Heart attack Father     Liver disease Brother         on O2    No Known Problems Daughter     Obesity Son         post gastric bypass       Physical Exam  Vitals and nursing note reviewed  Constitutional:       General: She is not in acute distress  Appearance: She is not diaphoretic  HENT:      Head: Normocephalic and atraumatic  Eyes:      Conjunctiva/sclera: Conjunctivae normal    Cardiovascular:      Rate and Rhythm: Normal rate and regular rhythm  Pulses: Intact distal pulses  Heart sounds: Normal heart sounds  Pulmonary:      Effort: Pulmonary effort is normal       Breath sounds: Normal breath sounds  Abdominal:      General: Bowel sounds are normal       Palpations: Abdomen is soft  Musculoskeletal:         General: Normal range of motion        Cervical back: Normal range of motion and neck supple  Skin:     General: Skin is warm and dry  Neurological:      Mental Status: She is alert and oriented to person, place, and time  Vitals: There were no vitals taken for this visit  Wt Readings from Last 3 Encounters:   07/29/21 83 9 kg (185 lb)   06/17/21 83 9 kg (185 lb)   10/05/20 83 9 kg (185 lb)         Labs & Results:  Lab Results   Component Value Date    WBC 6 37 07/29/2021    HGB 12 4 07/29/2021    HCT 38 5 07/29/2021    MCV 97 07/29/2021     07/29/2021     No results found for: BNP  No components found for: CHEM  Troponin I   Date Value Ref Range Status   07/29/2021 <0 02 <=0 04 ng/mL Final     Comment:     3Autovalidation override  Siemens Chemistry analyzer 99% cutoff is > 0 04 ng/mL in network labs     o cTnI 99% cutoff is useful only when applied to patients in the clinical setting of myocardial ischemia   o cTnI 99% cutoff should be interpreted in the context of clinical history, ECG findings and possibly cardiac imaging to establish correct diagnosis  o cTnI 99% cutoff may be suggestive but clearly not indicative of a coronary event without the clinical setting of myocardial ischemia      04/11/2019 <0 02 <=0 04 ng/mL Final     Comment:     3Autovalidation override  Siemens Chemistry analyzer 99% cutoff is > 0 04 ng/mL in network labs     o cTnI 99% cutoff is useful only when applied to patients in the clinical setting of myocardial ischemia   o cTnI 99% cutoff should be interpreted in the context of clinical history, ECG findings and possibly cardiac imaging to establish correct diagnosis     o cTnI 99% cutoff may be suggestive but clearly not indicative of a coronary event without the clinical setting of myocardial ischemia      03/01/2019 <0 02 <=0 04 ng/mL Final     Comment:     3Autovalidation override  Siemens Chemistry analyzer 99% cutoff is > 0 04 ng/mL in network labs     o cTnI 99% cutoff is useful only when applied to patients in the clinical setting of myocardial ischemia   o cTnI 99% cutoff should be interpreted in the context of clinical history, ECG findings and possibly cardiac imaging to establish correct diagnosis  o cTnI 99% cutoff may be suggestive but clearly not indicative of a coronary event without the clinical setting of myocardial ischemia  No results found for this or any previous visit  No results found for this or any previous visit  This note was completed in part utilizing [employer] direct voice recognition software  Grammatical errors, random word insertion, spelling mistakes, and incomplete sentences may be an occasional consequence of the system secondary to software limitations, ambient noise and hardware issues  At the time of dictation, efforts were made to edit, clarify and /or correct errors  Please read the chart carefully and recognize, using context, where substitutions have occurred    If you have any questions or concerns about the context, text or information contained within the body of this dictation, please contact myself, the provider, for further clarification

## 2021-08-17 ENCOUNTER — OFFICE VISIT (OUTPATIENT)
Dept: CARDIOLOGY CLINIC | Facility: CLINIC | Age: 71
End: 2021-08-17
Payer: MEDICARE

## 2021-08-17 VITALS
SYSTOLIC BLOOD PRESSURE: 138 MMHG | WEIGHT: 190.4 LBS | HEART RATE: 74 BPM | BODY MASS INDEX: 32.5 KG/M2 | HEIGHT: 64 IN | DIASTOLIC BLOOD PRESSURE: 72 MMHG

## 2021-08-17 DIAGNOSIS — I47.1 PSVT (PAROXYSMAL SUPRAVENTRICULAR TACHYCARDIA) (HCC): ICD-10-CM

## 2021-08-17 DIAGNOSIS — E78.00 PURE HYPERCHOLESTEROLEMIA: ICD-10-CM

## 2021-08-17 DIAGNOSIS — R42 VERTIGO: ICD-10-CM

## 2021-08-17 DIAGNOSIS — R07.9 CHEST PAIN, UNSPECIFIED TYPE: Primary | ICD-10-CM

## 2021-08-17 DIAGNOSIS — R06.02 SOB (SHORTNESS OF BREATH): ICD-10-CM

## 2021-08-17 PROCEDURE — 93000 ELECTROCARDIOGRAM COMPLETE: CPT | Performed by: NURSE PRACTITIONER

## 2021-08-17 PROCEDURE — 99214 OFFICE O/P EST MOD 30 MIN: CPT | Performed by: NURSE PRACTITIONER

## 2021-08-17 NOTE — PATIENT INSTRUCTIONS
Mediterranean Diet   AMBULATORY CARE:   A Mediterranean diet  is a meal plan that includes foods that are commonly eaten in countries that border the [place]  This meal plan may provide several health benefits  These include losing or maintaining weight, and decreasing blood pressure, blood sugar, and cholesterol levels  It may also help protect against certain health conditions such as heart disease, cancer, type 2 diabetes, and Alzheimer disease  Work with a dietitian to develop a meal plan that is right for you  Foods to include in the [address] [facility] diet:   · Include fruits and vegetables in each meal   Eat a variety of fresh fruits and vegetables  · Choose whole grains every day  These foods include whole-grain breads, pastas, and cereals  It also includes brown rice, quinoa, and millet  · Use unsaturated fats instead of saturated fats  Cook with olive or canola oil  Limit saturated fats, such as butter, margarine, and shortening  Saturated fat is an unhealthy fat that can increase your cholesterol levels  · Choose plant foods, poultry, and fish as your main sources of protein  ? Eat plant-based foods that provide protein,  such as lentils, beans, chickpeas, nuts, and seeds  Choose mostly plant-based foods in place of meat on most days of the week  ? Eat protein foods high in omega-3 fats  Fish high in omega-3 fats include salmon, trout, and tuna  Include these types of fish 1 or 2 times each week  Limit fish high in mercury, such as shark, swordfish, tilefish, and rafael mackerel  Omega-3 fats are also found in walnuts and flaxseed  ? Choose poultry (chicken or turkey)  without skin instead of red meat  Red meat is high in saturated fat  Limit eggs and high-fat meats, such as angulo, sausage, and hot dogs  · Choose low-fat dairy foods  such as nonfat or 1% milk, or low-fat almond, cashew, or soy milk   Other examples include low-fat cheese, yogurt, and cottage cheese  · Limit sweets  Limit your intake of high-sugar foods, such as soda, desserts, and candy  · Talk to your healthcare provider about alcohol  Studies have shown that moderate intake of wine may reduce the risk of heart disease  A moderate amount of wine is 1 serving for women and men 65 years and older each day  Two servings is recommended for men 24to 59years of age each day  A serving of wine is 5 ounces  Other things you need to know if you follow the Mediterranean diet:   · Include foods high in iron and vitamin C   Plant-based foods that are high in iron include spinach, beans, tofu, and artichoke  Eat a serving of vitamin C with any iron-rich food to help your body absorb more iron  Examples include oranges, strawberries, cantaloupe, broccoli, and yellow peppers  · Get regular physical activity  The Mediterranean diet will have the most benefit if you get regular physical activity  Get 30 minutes of physical activity at least 5 days a week  Choose physical activities that increase your heart rate  Examples include walking, hiking, swimming, and riding a bike  Ask your healthcare provider about the best exercise plan for you  © Copyright [employer] 2021 Information is for End User's use only and may not be sold, redistributed or otherwise used for commercial purposes  All illustrations and images included in CareNotes® are the copyrighted property of [employer]  or Eduar Matta  The above information is an  only  It is not intended as medical advice for individual conditions or treatments  Talk to your doctor, nurse or pharmacist before following any medical regimen to see if it is safe and effective for you

## 2021-08-24 ENCOUNTER — OFFICE VISIT (OUTPATIENT)
Dept: ENDOCRINOLOGY | Facility: HOSPITAL | Age: 71
End: 2021-08-24
Payer: MEDICARE

## 2021-08-24 VITALS
SYSTOLIC BLOOD PRESSURE: 126 MMHG | DIASTOLIC BLOOD PRESSURE: 74 MMHG | WEIGHT: 186.4 LBS | HEART RATE: 84 BPM | BODY MASS INDEX: 31.82 KG/M2 | HEIGHT: 64 IN

## 2021-08-24 DIAGNOSIS — E06.3 HYPOTHYROIDISM DUE TO HASHIMOTO'S THYROIDITIS: Primary | ICD-10-CM

## 2021-08-24 DIAGNOSIS — E03.8 HYPOTHYROIDISM DUE TO HASHIMOTO'S THYROIDITIS: Primary | ICD-10-CM

## 2021-08-24 PROCEDURE — 99213 OFFICE O/P EST LOW 20 MIN: CPT | Performed by: INTERNAL MEDICINE

## 2021-08-24 RX ORDER — LORAZEPAM 0.5 MG/1
TABLET ORAL AS NEEDED
COMMUNITY
Start: 2021-08-23

## 2021-08-24 RX ORDER — INDOMETHACIN 25 MG/1
25 CAPSULE ORAL AS NEEDED
COMMUNITY

## 2021-08-24 RX ORDER — LEVOTHYROXINE SODIUM 88 UG/1
88 TABLET ORAL DAILY
Qty: 30 TABLET | Refills: 11 | Status: SHIPPED | OUTPATIENT
Start: 2021-08-24

## 2021-08-24 NOTE — PATIENT INSTRUCTIONS
The thyroid blood work is normal    the mildly elevated free T4 may be due to taking the synthroid before the test    The is elevation is not significant given the TSH is normal   This is not he cause to the heart symptoms  Continue the same levothyroxine 88 mcg daily  follow up in 1 year with blood work  Try to make sure to take the levothyroxine after the blood work is drawn

## 2021-08-24 NOTE — PROGRESS NOTES
8/24/2021    Assessment/Plan      Diagnoses and all orders for this visit:    Hypothyroidism due to Hashimoto's thyroiditis  -     TSH, 3rd generation Lab Collect; Future  -     T4, free Lab Collect; Future  -     levothyroxine 88 mcg tablet; Take 1 tablet (88 mcg total) by mouth daily    Other orders  -     LORazepam (ATIVAN) 0 5 mg tablet; as needed  -     indomethacin (INDOCIN) 25 mg capsule; Take 25 mg by mouth as needed for mild pain        Assessment/Plan:   Hypothyroidism due to Hashimoto's thyroiditis  Most recent thyroid function tests do show a normal TSH  Her mildly elevated free T4 may be due to her having taken Synthroid prior to that blood work being done  This is not a significant elevation given her TSH is normal and a normal TSH signifies biochemical euthyroidism  This mildly elevated free T4 is also not because of her heart symptoms  I would have her continue the same levothyroxine 88 mcg daily and to make sure that she takes her levothyroxine after she gets blood work done in the future so as not to have an elevated free T4 level  I have asked her to follow up in 1 year with preceding TSH and free T4       CC: Hypothyroid follow-up    History of Present Illness     HPI: Lindy Daugherty is a 66y o  year old female with history of  Hypothyroidism due to Hashimoto's thyroiditis for follow-up visit  She was diagnosed with hypothyroidism over 23 years ago  She is currently taking thyroid hormone for replacement purposes  She takes levothyroxine 88 mcg daily  She was in the hospital recently in the emergency room with chest pressure and shortness of breath and heart rate of 106  She has a history of SVT post ablation in 1998  She denies heat or cold intolerance but can be children sitting ideal in the house in the air conditioning  She denies tremors, fatigue, or weight changes    She has some insomnia in that she will wake up at night at times and be unable to get back to sleep   She has some anxiety  She denies depression, diarrhea or constipation  She has dry skin but no brittle nails and has less hair loss  She denies diplopia  She denies compressive thyroid symptoms or difficulties with swallowing  Review of Systems   Constitutional: Negative for fatigue and unexpected weight change  Weight up a bit but now coming back down some  HENT: Negative for trouble swallowing  Eyes: Positive for visual disturbance  No diplopia  Wears glasses  Has a cataract forming and it affects her vision some  Respiratory: Negative for chest tightness and shortness of breath  Cardiovascular: Positive for palpitations  Negative for chest pain  Started in July 2021 with rapid heart beat like her previous a fib symptoms  BP was low and pulse 106 in the past  Was about every other day  She was in the ER and EKG ok  Under a lot of stress with  an she cares for him and daughter and grandson have been stressful  Now on Zofran for nausea an ativan for anxiety  She is on a Holter monitor now  Gastrointestinal: Positive for nausea  Negative for abdominal pain, constipation and diarrhea  Endocrine: Negative for cold intolerance and heat intolerance  Will be chilled sitting in the house with [facility] on    Skin: Negative for rash  Has dry skin  No brittle nails  Less hair loss recently  Neurological: Negative for dizziness, tremors, light-headedness and headaches  Psychiatric/Behavioral: Positive for sleep disturbance  Negative for dysphoric mood  The patient is nervous/anxious  Will have some nights with waking and unable to get back to sleep         Historical Information   Past Medical History:   Diagnosis Date    Asthma     Atrial fibrillation ([facility] [address] )     Breast cyst     Disease of thyroid gland     Endometriosis     Fibroid     Gout     Hyperlipidemia     Hypertension     Osteoarthritis     Silent myocardial infarction ([facility] [address] )     Vertigo     at times     Past Surgical History:   Procedure Laterality Date    BREAST CYST ASPIRATION Bilateral     CARDIAC ELECTROPHYSIOLOGY STUDY AND ABLATION      CYST REMOVAL Bilateral     aspiration    HYSTERECTOMY      age 41    OOPHORECTOMY Bilateral     age 41   [facility] REPLACEMENT TOTAL KNEE BILATERAL Bilateral     TONSILLECTOMY AND ADENOIDECTOMY       Social History   Social History     Substance and Sexual Activity   Alcohol Use No     Social History     Substance and Sexual Activity   Drug Use No     Social History     Tobacco Use   Smoking Status Never Smoker   Smokeless Tobacco Never Used     Family History:   Family History   Problem Relation Age of Onset    Alzheimer's disease Mother     Hypertension Father     Heart attack Father     Liver disease Brother         on O2    No Known Problems Daughter     Obesity Son         post gastric bypass       Meds/Allergies   Current Outpatient Medications   Medication Sig Dispense Refill    albuterol (PROAIR HFA) 90 mcg/act inhaler 2 puffs as needed      indomethacin (INDOCIN) 25 mg capsule Take 25 mg by mouth as needed for mild pain      levothyroxine 88 mcg tablet Take 1 tablet (88 mcg total) by mouth daily 30 tablet 11    LORazepam (ATIVAN) 0 5 mg tablet as needed      meclizine (ANTIVERT) 12 5 MG tablet 1 tablet as needed      ondansetron (ZOFRAN) 8 mg tablet Take by mouth every 8 (eight) hours as needed for nausea or vomiting      pantoprazole (PROTONIX) 40 mg tablet Take 40 mg by mouth 2 (two) times a day  3    simvastatin (ZOCOR) 20 mg tablet Take 20 mg by mouth daily at bedtime         No current facility-administered medications for this visit  Allergies   Allergen Reactions    Penicillin G      Other reaction(s): anaphylaxis    Erythromycin      Other reaction(s): n/v       Objective   Vitals: Blood pressure 126/74, pulse 84, height 5' 4" (1 626 m), weight 84 6 kg (186 lb 6 4 oz)    Invasive Devices     None Physical Exam  Vitals reviewed  Constitutional:       Appearance: Normal appearance  She is well-developed  She is obese  HENT:      Head: Normocephalic and atraumatic  Eyes:      Extraocular Movements: Extraocular movements intact  Conjunctiva/sclera: Conjunctivae normal       Comments: No lid lag, stare, proptosis, or periorbital edema  Neck:      Thyroid: No thyromegaly  Vascular: No carotid bruit  Comments: Thyroid normal in size  No palpable thyroid nodules  No bruits over the thyroid gland  Cardiovascular:      Rate and Rhythm: Normal rate and regular rhythm  Heart sounds: Normal heart sounds  No murmur heard  Pulmonary:      Effort: Pulmonary effort is normal       Breath sounds: Normal breath sounds  No wheezing  Abdominal:      Palpations: Abdomen is soft  Musculoskeletal:         General: No deformity  Normal range of motion  Cervical back: Normal range of motion and neck supple  Right lower leg: No edema  Left lower leg: No edema  Comments: No tremor of the outstretched hands  Lymphadenopathy:      Cervical: No cervical adenopathy  Skin:     General: Skin is warm and dry  Findings: No rash  Neurological:      Mental Status: She is alert and oriented to person, place, and time  Deep Tendon Reflexes: Reflexes are normal and symmetric  Comments: Deep tendon reflexes decreased at the right patellar area but intact to the left patellar area  The history was obtained from the review of the chart and from the patient  Lab Results:        Blood work done on 08/02/2021 showed a TSH of 1 47 with a free T4 of 1 49      Lab Results   Component Value Date    CREATININE 1 08 07/29/2021    CREATININE 1 16 10/27/2020    CREATININE 1 29 05/19/2020    BUN 19 07/29/2021     05/06/2015    K 3 9 07/29/2021     07/29/2021    CO2 27 07/29/2021     eGFR   Date Value Ref Range Status   07/29/2021 51 ml/min/1 73sq m Final Lab Results   Component Value Date    CHOL 146 11/20/2015    HDL 75 10/27/2020    TRIG 63 10/27/2020       Lab Results   Component Value Date    ALT 21 07/29/2021    AST 13 07/29/2021    ALKPHOS 71 07/29/2021    BILITOT 0 65 05/06/2015           Future Appointments   Date Time Provider Robosn Miguelina   9/7/2021  1:00 PM [facility] ECHO 1 [facility]   10/5/2021  9:40 AM Daniel Schafer MD CARD [facility]   8/23/2022  8:20 AM Marjorie Loo MD ENDO [facility]

## 2021-09-07 ENCOUNTER — HOSPITAL ENCOUNTER (OUTPATIENT)
Dept: NON INVASIVE DIAGNOSTICS | Age: 71
Discharge: HOME | End: 2021-09-07
Payer: MEDICARE

## 2021-09-07 DIAGNOSIS — I47.1 PSVT (PAROXYSMAL SUPRAVENTRICULAR TACHYCARDIA) (HCC): ICD-10-CM

## 2021-09-07 DIAGNOSIS — R07.9 CHEST PAIN, UNSPECIFIED TYPE: ICD-10-CM

## 2021-09-07 DIAGNOSIS — R06.02 SOB (SHORTNESS OF BREATH): ICD-10-CM

## 2021-09-07 PROCEDURE — 93306 TTE W/DOPPLER COMPLETE: CPT

## 2021-09-07 PROCEDURE — 93306 TTE W/DOPPLER COMPLETE: CPT | Performed by: INTERNAL MEDICINE

## 2021-09-09 ENCOUNTER — CLINICAL SUPPORT (OUTPATIENT)
Dept: CARDIOLOGY CLINIC | Facility: CLINIC | Age: 71
End: 2021-09-09
Payer: MEDICARE

## 2021-09-09 DIAGNOSIS — I47.1 PSVT (PAROXYSMAL SUPRAVENTRICULAR TACHYCARDIA) (HCC): ICD-10-CM

## 2021-09-09 DIAGNOSIS — R07.9 CHEST PAIN, UNSPECIFIED TYPE: ICD-10-CM

## 2021-09-09 PROCEDURE — 93248 EXT ECG>7D<15D REV&INTERPJ: CPT | Performed by: INTERNAL MEDICINE

## 2021-10-05 ENCOUNTER — OFFICE VISIT (OUTPATIENT)
Dept: CARDIOLOGY CLINIC | Facility: CLINIC | Age: 71
End: 2021-10-05
Payer: MEDICARE

## 2021-10-05 VITALS
HEART RATE: 70 BPM | SYSTOLIC BLOOD PRESSURE: 110 MMHG | HEIGHT: 64 IN | WEIGHT: 189.8 LBS | DIASTOLIC BLOOD PRESSURE: 82 MMHG | BODY MASS INDEX: 32.4 KG/M2

## 2021-10-05 DIAGNOSIS — R07.9 CHEST PAIN, UNSPECIFIED TYPE: Primary | ICD-10-CM

## 2021-10-05 DIAGNOSIS — I47.1 PSVT (PAROXYSMAL SUPRAVENTRICULAR TACHYCARDIA) (HCC): ICD-10-CM

## 2021-10-05 PROCEDURE — 99214 OFFICE O/P EST MOD 30 MIN: CPT | Performed by: INTERNAL MEDICINE

## 2022-07-13 ENCOUNTER — HOSPITAL ENCOUNTER (OUTPATIENT)
Dept: MAMMOGRAPHY | Facility: CLINIC | Age: 72
Discharge: HOME | End: 2022-07-13
Payer: MEDICARE

## 2022-07-13 VITALS — BODY MASS INDEX: 28.45 KG/M2 | HEIGHT: 66 IN | WEIGHT: 177 LBS

## 2022-07-13 DIAGNOSIS — Z12.31 VISIT FOR SCREENING MAMMOGRAM: ICD-10-CM

## 2022-07-13 PROCEDURE — 77067 SCR MAMMO BI INCL CAD: CPT

## 2022-07-13 PROCEDURE — 77063 BREAST TOMOSYNTHESIS BI: CPT

## 2022-07-29 ENCOUNTER — APPOINTMENT (OUTPATIENT)
Dept: LAB | Facility: CLINIC | Age: 72
End: 2022-07-29
Payer: MEDICARE

## 2022-07-29 DIAGNOSIS — E03.8 HYPOTHYROIDISM DUE TO HASHIMOTO'S THYROIDITIS: ICD-10-CM

## 2022-07-29 DIAGNOSIS — E06.3 HYPOTHYROIDISM DUE TO HASHIMOTO'S THYROIDITIS: ICD-10-CM

## 2022-07-29 LAB
T4 FREE SERPL-MCNC: 1.47 NG/DL (ref 0.76–1.46)
TSH SERPL DL<=0.05 MIU/L-ACNC: 1.49 UIU/ML (ref 0.45–4.5)

## 2022-07-29 PROCEDURE — 84443 ASSAY THYROID STIM HORMONE: CPT

## 2022-07-29 PROCEDURE — 84439 ASSAY OF FREE THYROXINE: CPT

## 2022-07-29 PROCEDURE — 36415 COLL VENOUS BLD VENIPUNCTURE: CPT

## 2022-08-20 DIAGNOSIS — E03.8 HYPOTHYROIDISM DUE TO HASHIMOTO'S THYROIDITIS: ICD-10-CM

## 2022-08-20 DIAGNOSIS — E06.3 HYPOTHYROIDISM DUE TO HASHIMOTO'S THYROIDITIS: ICD-10-CM

## 2022-08-22 RX ORDER — LEVOTHYROXINE SODIUM 88 UG/1
TABLET ORAL
Qty: 30 TABLET | Refills: 11 | Status: SHIPPED | OUTPATIENT
Start: 2022-08-22

## 2022-08-23 ENCOUNTER — OFFICE VISIT (OUTPATIENT)
Dept: ENDOCRINOLOGY | Facility: HOSPITAL | Age: 72
End: 2022-08-23
Payer: MEDICARE

## 2022-08-23 VITALS
HEART RATE: 70 BPM | SYSTOLIC BLOOD PRESSURE: 128 MMHG | WEIGHT: 181 LBS | HEIGHT: 66 IN | BODY MASS INDEX: 29.09 KG/M2 | DIASTOLIC BLOOD PRESSURE: 76 MMHG

## 2022-08-23 DIAGNOSIS — E06.3 HYPOTHYROIDISM DUE TO HASHIMOTO'S THYROIDITIS: Primary | ICD-10-CM

## 2022-08-23 DIAGNOSIS — E03.8 HYPOTHYROIDISM DUE TO HASHIMOTO'S THYROIDITIS: Primary | ICD-10-CM

## 2022-08-23 PROCEDURE — 99213 OFFICE O/P EST LOW 20 MIN: CPT | Performed by: INTERNAL MEDICINE

## 2022-08-23 NOTE — PATIENT INSTRUCTIONS
The thyroid blood work is good  Continue the same levothyroxine 88 mcg daily  Follow up in 1 year with blood work

## 2022-08-23 NOTE — PROGRESS NOTES
8/23/2022    Assessment/Plan      Diagnoses and all orders for this visit:    Hypothyroidism due to Hashimoto's thyroiditis  -     TSH, 3rd generation Lab Collect; Future  -     T4, free Lab Collect; Future        Assessment/Plan:  Hypothyroidism due to Hashimoto's thyroiditis  Most recent thyroid function tests are normal   She is clinically and biochemically euthyroid  She will continue the same levothyroxine 88 mcg daily  I have asked her to follow up in 1 year with preceding TSH and free T4       CC:  Hypothyroid follow-up    History of Present Illness     HPI: Sreekanth Donahue is a 74y o  year old female with history of hypothyroidism due to Hashimoto's thyroiditis for follow-up visit  She was diagnosed with hypothyroidism over 24 years ago  She is currently taking thyroid hormone for replacement purposes and takes levothyroxine 88 mcg daily  She is 5 lb less than last year  She has some increase in fatigue  She has some constipation  She will wake early and not be able to get back to sleep per can be up for several hours at night and has some anxiety  She has dry skin but no brittle nails or hair loss  She denies diarrhea, depression, palpitation, tremors, heat or cold intolerance  She denies diplopia  She denies compressive thyroid symptoms or difficulties with swallowing   diagnosed with 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  He did chemo and XRT  Now has anxiety medicine for bedtime  Took very few ativan  He is now on immunotherapy  He has inflammation post XRT of the lungs and coughs a lot  She gets stressed as she can not help his symptoms  Review of Systems   Constitutional: Positive for fatigue  Negative for unexpected weight change  Weight 5 lb less than last year  Some increase in fatigue  HENT: Negative for trouble swallowing  Eyes: Negative for visual disturbance  Wears glasses   Has cataracts and for surgery next week and the second one in September  No diplopia  Respiratory: Negative for chest tightness and shortness of breath  Cardiovascular: Negative for chest pain and palpitations  Gastrointestinal: Positive for constipation and nausea  Negative for abdominal pain and diarrhea  Some constipation  Nausea with anxiety  Endocrine: Negative for cold intolerance and heat intolerance  Musculoskeletal: Positive for arthralgias  Right shoulder pain after a fall on the shoulder  Skin: Negative for rash  Has dry skin  No brittle nails  No hair loss  Neurological: Negative for dizziness, tremors, light-headedness and headaches  Psychiatric/Behavioral: Positive for sleep disturbance  Negative for dysphoric mood  The patient is nervous/anxious  Will wake early and not be able to get back to sleep or up for several hours overnight          Historical Information   Past Medical History:   Diagnosis Date    Asthma     Atrial fibrillation ([facility] [address] )     Breast cyst     Disease of thyroid gland     Endometriosis     Fibroid     Gout     Hyperlipidemia     Hypertension     Osteoarthritis     Silent myocardial infarction (HCC)     Vertigo     at times     Past Surgical History:   Procedure Laterality Date    BREAST CYST ASPIRATION Bilateral     CARDIAC ELECTROPHYSIOLOGY STUDY AND ABLATION      CYST REMOVAL Bilateral     aspiration    HYSTERECTOMY      age 41    OOPHORECTOMY Bilateral     age 41   Ardeth Needs REPLACEMENT TOTAL KNEE BILATERAL Bilateral     TONSILLECTOMY AND ADENOIDECTOMY       Social History   Social History     Substance and Sexual Activity   Alcohol Use No     Social History     Substance and Sexual Activity   Drug Use No     Social History     Tobacco Use   Smoking Status Never Smoker   Smokeless Tobacco Never Used     Family History:   Family History   Problem Relation Age of Onset    Alzheimer's disease Mother     Hypertension Father     Heart attack Father     Liver disease Brother on O2    Heart disease Brother     No Known Problems Daughter     Obesity Son         post gastric bypass       Meds/Allergies   Current Outpatient Medications   Medication Sig Dispense Refill    albuterol (PROVENTIL HFA,VENTOLIN HFA) 90 mcg/act inhaler 2 puffs as needed      indomethacin (INDOCIN) 25 mg capsule Take 25 mg by mouth as needed for mild pain      levothyroxine 88 mcg tablet TAKE 1 TABLET BY MOUTH DAILY 30 tablet 11    LORazepam (ATIVAN) 0 5 mg tablet as needed      meclizine (ANTIVERT) 12 5 MG tablet 1 tablet as needed      ondansetron (ZOFRAN) 8 mg tablet Take by mouth every 8 (eight) hours as needed for nausea or vomiting      pantoprazole (PROTONIX) 40 mg tablet Take 40 mg by mouth daily  3    simvastatin (ZOCOR) 20 mg tablet Take 20 mg by mouth daily at bedtime         No current facility-administered medications for this visit  Allergies   Allergen Reactions    Penicillin G      Other reaction(s): anaphylaxis    Erythromycin      Other reaction(s): n/v       Objective   Vitals: Blood pressure 128/76, pulse 70, height 5' 6" (1 676 m), weight 82 1 kg (181 lb)  Invasive Devices  Report    None                 Physical Exam  Vitals reviewed  Constitutional:       Appearance: Normal appearance  She is well-developed  HENT:      Head: Normocephalic and atraumatic  Eyes:      Extraocular Movements: Extraocular movements intact  Conjunctiva/sclera: Conjunctivae normal       Comments: No lid lag, stare, proptosis, or periorbital edema  Neck:      Thyroid: No thyromegaly  Vascular: No carotid bruit  Comments: Thyroid normal in size  No palpable thyroid nodules  Cardiovascular:      Rate and Rhythm: Normal rate and regular rhythm  Heart sounds: Normal heart sounds  No murmur heard  Pulmonary:      Effort: Pulmonary effort is normal       Breath sounds: Normal breath sounds  No wheezing  Abdominal:      Palpations: Abdomen is soft     Musculoskeletal: General: No deformity  Normal range of motion  Cervical back: Normal range of motion and neck supple  Right lower leg: No edema  Left lower leg: No edema  Comments: No tremor of the outstretched hands  Lymphadenopathy:      Cervical: No cervical adenopathy  Skin:     General: Skin is warm and dry  Findings: No rash  Neurological:      Mental Status: She is alert and oriented to person, place, and time  Deep Tendon Reflexes: Reflexes are normal and symmetric  Comments: Patellar deep tendon reflexes normal          The history was obtained from the review of the chart and from the patient  Lab Results:      Blood work done on 07/29/2022 showed a TSH of 1 49 with a free T4 of 1 47      Lab Results   Component Value Date    CREATININE 1 08 07/29/2021    CREATININE 1 16 10/27/2020    CREATININE 1 29 05/19/2020    BUN 19 07/29/2021     05/06/2015    K 3 9 07/29/2021     07/29/2021    CO2 27 07/29/2021     eGFR   Date Value Ref Range Status   07/29/2021 51 ml/min/1 73sq m Final         Lab Results   Component Value Date    CHOL 146 11/20/2015    HDL 75 10/27/2020    TRIG 63 10/27/2020       Lab Results   Component Value Date    ALT 21 07/29/2021    AST 13 07/29/2021    ALKPHOS 71 07/29/2021    BILITOT 0 65 05/06/2015           Future Appointments   Date Time Provider Robson Mcintyre   8/28/2023  8:20 AM Jose Clemons MD [facility]

## 2022-10-14 ENCOUNTER — APPOINTMENT (OUTPATIENT)
Dept: LAB | Facility: CLINIC | Age: 72
End: 2022-10-14
Payer: MEDICARE

## 2022-10-14 DIAGNOSIS — I10 HYPERTENSION, ESSENTIAL: ICD-10-CM

## 2022-10-14 DIAGNOSIS — E78.5 HYPERLIPIDEMIA, UNSPECIFIED HYPERLIPIDEMIA TYPE: ICD-10-CM

## 2022-10-14 LAB
ALBUMIN SERPL BCP-MCNC: 3.3 G/DL (ref 3.5–5)
ALP SERPL-CCNC: 68 U/L (ref 46–116)
ALT SERPL W P-5'-P-CCNC: 16 U/L (ref 12–78)
ANION GAP SERPL CALCULATED.3IONS-SCNC: 4 MMOL/L (ref 4–13)
AST SERPL W P-5'-P-CCNC: 17 U/L (ref 5–45)
BILIRUB SERPL-MCNC: 1.08 MG/DL (ref 0.2–1)
BUN SERPL-MCNC: 16 MG/DL (ref 5–25)
CALCIUM ALBUM COR SERPL-MCNC: 9.7 MG/DL (ref 8.3–10.1)
CALCIUM SERPL-MCNC: 9.1 MG/DL (ref 8.3–10.1)
CHLORIDE SERPL-SCNC: 107 MMOL/L (ref 96–108)
CHOLEST SERPL-MCNC: 164 MG/DL
CO2 SERPL-SCNC: 27 MMOL/L (ref 21–32)
CREAT SERPL-MCNC: 0.96 MG/DL (ref 0.6–1.3)
GFR SERPL CREATININE-BSD FRML MDRD: 58 ML/MIN/1.73SQ M
GLUCOSE P FAST SERPL-MCNC: 92 MG/DL (ref 65–99)
HDLC SERPL-MCNC: 58 MG/DL
LDLC SERPL CALC-MCNC: 94 MG/DL (ref 0–100)
NONHDLC SERPL-MCNC: 106 MG/DL
POTASSIUM SERPL-SCNC: 4.2 MMOL/L (ref 3.5–5.3)
PROT SERPL-MCNC: 7.6 G/DL (ref 6.4–8.4)
SODIUM SERPL-SCNC: 138 MMOL/L (ref 135–147)
TRIGL SERPL-MCNC: 61 MG/DL

## 2022-10-14 PROCEDURE — 80061 LIPID PANEL: CPT

## 2022-10-14 PROCEDURE — 36415 COLL VENOUS BLD VENIPUNCTURE: CPT

## 2022-10-14 PROCEDURE — 80053 COMPREHEN METABOLIC PANEL: CPT

## 2022-11-08 ENCOUNTER — OFFICE VISIT (OUTPATIENT)
Dept: CARDIOLOGY CLINIC | Facility: CLINIC | Age: 72
End: 2022-11-08

## 2022-11-08 VITALS
DIASTOLIC BLOOD PRESSURE: 74 MMHG | HEIGHT: 66 IN | HEART RATE: 66 BPM | SYSTOLIC BLOOD PRESSURE: 120 MMHG | WEIGHT: 178.4 LBS | BODY MASS INDEX: 28.67 KG/M2

## 2022-11-08 DIAGNOSIS — I47.1 PSVT (PAROXYSMAL SUPRAVENTRICULAR TACHYCARDIA) (HCC): Primary | ICD-10-CM

## 2022-11-08 NOTE — PROGRESS NOTES
Cardiology Follow Up    Jocelin Moralez  1950  [id number]  [address] [facility] [address]  [phone]    1  PSVT (paroxysmal supraventricular tachycardia) (HCC)  POCT ECG       Interval History: Followup for PSVT  Doing well  No chest pain, dyspnea or palpitations       Medical Problems             Problem List     Hypothyroidism due to Hashimoto's thyroiditis              Past Medical History:   Diagnosis Date   • Asthma    • Atrial fibrillation (HCC)    • Breast cyst    • Disease of thyroid gland    • Endometriosis    • Fibroid    • Gout    • Hyperlipidemia    • Hypertension    • Osteoarthritis    • Silent myocardial infarction ([facility] [address] )    • Vertigo     at times     Social History     Socioeconomic History   • Marital status: /Civil Union     Spouse name: Not on file   • Number of children: Not on file   • Years of education: Not on file   • Highest education level: Not on file   Occupational History   • Occupation: watching [employer]   Tobacco Use   • Smoking status: Never Smoker   • Smokeless tobacco: Never Used   Vaping Use   • Vaping Use: Never used   Substance and Sexual Activity   • Alcohol use: No   • Drug use: No   • Sexual activity: Not Currently   Other Topics Concern   • Not on file   Social History Narrative   • Not on file     Social Determinants of Health     Financial Resource Strain: Not on file   Food Insecurity: Not on file   Transportation Needs: Not on file   Physical Activity: Not on file   Stress: Not on file   Social Connections: Not on file   Intimate Partner Violence: Not on file   Housing Stability: Not on file      Family History   Problem Relation Age of Onset   • Alzheimer's disease Mother    • Hypertension Father    • Heart attack Father    • Liver disease Brother         on O2   • Heart disease Brother    • No Known Problems Daughter    • Obesity Son post gastric bypass     Past Surgical History:   Procedure Laterality Date   • BREAST CYST ASPIRATION Bilateral    • CARDIAC ELECTROPHYSIOLOGY STUDY AND ABLATION     • CYST REMOVAL Bilateral     aspiration   • HYSTERECTOMY      age 41   • OOPHORECTOMY Bilateral     age 41   • REPLACEMENT TOTAL KNEE BILATERAL Bilateral    • TONSILLECTOMY AND ADENOIDECTOMY         Current Outpatient Medications:   •  albuterol (PROVENTIL HFA,VENTOLIN HFA) 90 mcg/act inhaler, 2 puffs as needed, Disp: , Rfl:   •  indomethacin (INDOCIN) 25 mg capsule, Take 25 mg by mouth as needed for mild pain, Disp: , Rfl:   •  levothyroxine 88 mcg tablet, TAKE 1 TABLET BY MOUTH DAILY, Disp: 30 tablet, Rfl: 11  •  LORazepam (ATIVAN) 0 5 mg tablet, as needed, Disp: , Rfl:   •  meclizine (ANTIVERT) 12 5 MG tablet, 1 tablet as needed, Disp: , Rfl:   •  ondansetron (ZOFRAN) 8 mg tablet, Take by mouth every 8 (eight) hours as needed for nausea or vomiting, Disp: , Rfl:   •  pantoprazole (PROTONIX) 40 mg tablet, Take 40 mg by mouth daily, Disp: , Rfl: 3  •  simvastatin (ZOCOR) 20 mg tablet, Take 20 mg by mouth daily at bedtime  , Disp: , Rfl:   Allergies   Allergen Reactions   • Penicillin G      Other reaction(s): anaphylaxis   • Erythromycin      Other reaction(s): n/v       Labs:     Chemistry        Component Value Date/Time     05/06/2015 0804    K 4 2 10/14/2022 0731    K 4 6 05/06/2015 0804     10/14/2022 0731     05/06/2015 0804    CO2 27 10/14/2022 0731    CO2 29 05/06/2015 0804    BUN 16 10/14/2022 0731    BUN 20 05/06/2015 0804    CREATININE 0 96 10/14/2022 0731    CREATININE 0 94 05/06/2015 0804        Component Value Date/Time    CALCIUM 9 1 10/14/2022 0731    CALCIUM 8 7 05/06/2015 0804    ALKPHOS 68 10/14/2022 0731    ALKPHOS 80 05/06/2015 0804    AST 17 10/14/2022 0731    AST 21 05/06/2015 0804    ALT 16 10/14/2022 0731    ALT 25 05/06/2015 0804    BILITOT 0 65 05/06/2015 0804            Lab Results   Component Value Date CHOL 146 11/20/2015    CHOL 169 05/06/2015    CHOL 174 10/03/2014     Lab Results   Component Value Date    HDL 58 10/14/2022    HDL 75 10/27/2020    HDL 57 10/17/2019     Lab Results   Component Value Date    LDLCALC 94 10/14/2022    LDLCALC 90 10/27/2020    LDLCALC 94 10/17/2019     Lab Results   Component Value Date    TRIG 61 10/14/2022    TRIG 63 10/27/2020    TRIG 68 10/17/2019     No results found for: CHOLHDL    Imaging: No results found  EKG: NSR Normal ECG     Review of Systems   Constitutional: Negative  HENT: Negative  Eyes: Negative  Cardiovascular: Negative  Respiratory: Negative  Endocrine: Negative  Hematologic/Lymphatic: Negative  Skin: Negative  Musculoskeletal: Negative  Gastrointestinal: Negative  Genitourinary: Negative  Neurological: Negative  Psychiatric/Behavioral: Negative  Allergic/Immunologic: Negative  Vitals:    11/08/22 0851   BP: 120/74   Pulse: 66           Physical Exam  Vitals and nursing note reviewed  Constitutional:       Appearance: Normal appearance  HENT:      Head: Normocephalic  Nose: Nose normal       Mouth/Throat:      Mouth: Mucous membranes are moist    Eyes:      General: No scleral icterus  Conjunctiva/sclera: Conjunctivae normal    Cardiovascular:      Rate and Rhythm: Normal rate and regular rhythm  Heart sounds: No murmur heard  No gallop  Pulmonary:      Effort: Pulmonary effort is normal  No respiratory distress  Breath sounds: Normal breath sounds  No wheezing or rales  Abdominal:      General: Abdomen is flat  Bowel sounds are normal  There is no distension  Palpations: Abdomen is soft  Tenderness: There is no abdominal tenderness  There is no guarding  Musculoskeletal:      Cervical back: Normal range of motion and neck supple  Right lower leg: No edema  Left lower leg: No edema  Skin:     General: Skin is warm and dry     Neurological:      General: No focal deficit present  Mental Status: She is alert and oriented to person, place, and time  Psychiatric:         Mood and Affect: Mood normal          Behavior: Behavior normal          Discussion/Summary:      Dyslipidemia: Continue with simvastatin  LDL is 94       PSVT: She is in normal sinus rhythm today  Hx of SVT ablation in 1998      The patient was counseled regarding diagnostic results, instructions for management, risk factor reductions, impressions  total time of encounter was 25 minutes and 15 minutes was spent counseling

## 2022-12-30 ENCOUNTER — APPOINTMENT (OUTPATIENT)
Dept: RADIOLOGY | Facility: HOSPITAL | Age: 72
End: 2022-12-30

## 2022-12-30 ENCOUNTER — HOSPITAL ENCOUNTER (EMERGENCY)
Facility: HOSPITAL | Age: 72
Discharge: HOME | End: 2022-12-30
Attending: EMERGENCY MEDICINE

## 2022-12-30 ENCOUNTER — TELEPHONE (OUTPATIENT)
Dept: CARDIOLOGY CLINIC | Facility: CLINIC | Age: 72
End: 2022-12-30

## 2022-12-30 VITALS
WEIGHT: 175 LBS | TEMPERATURE: 98.8 F | OXYGEN SATURATION: 97 % | HEART RATE: 83 BPM | SYSTOLIC BLOOD PRESSURE: 171 MMHG | BODY MASS INDEX: 28.25 KG/M2 | DIASTOLIC BLOOD PRESSURE: 83 MMHG | RESPIRATION RATE: 18 BRPM

## 2022-12-30 DIAGNOSIS — R00.2 PALPITATIONS: Primary | ICD-10-CM

## 2022-12-30 LAB
2HR DELTA HS TROPONIN: 0 NG/L
ALBUMIN SERPL BCP-MCNC: 3.3 G/DL (ref 3.5–5)
ALP SERPL-CCNC: 99 U/L (ref 46–116)
ALT SERPL W P-5'-P-CCNC: 17 U/L (ref 12–78)
ANION GAP SERPL CALCULATED.3IONS-SCNC: 7 MMOL/L (ref 4–13)
AST SERPL W P-5'-P-CCNC: 19 U/L (ref 5–45)
ATRIAL RATE: 87 BPM
BASOPHILS # BLD AUTO: 0.07 THOUSANDS/ÂΜL (ref 0–0.1)
BASOPHILS NFR BLD AUTO: 1 % (ref 0–1)
BILIRUB SERPL-MCNC: 0.7 MG/DL (ref 0.2–1)
BUN SERPL-MCNC: 17 MG/DL (ref 5–25)
CALCIUM ALBUM COR SERPL-MCNC: 9.3 MG/DL (ref 8.3–10.1)
CALCIUM SERPL-MCNC: 8.7 MG/DL (ref 8.3–10.1)
CARDIAC TROPONIN I PNL SERPL HS: 6 NG/L
CARDIAC TROPONIN I PNL SERPL HS: 6 NG/L
CHLORIDE SERPL-SCNC: 107 MMOL/L (ref 96–108)
CO2 SERPL-SCNC: 27 MMOL/L (ref 21–32)
CREAT SERPL-MCNC: 1.14 MG/DL (ref 0.6–1.3)
EOSINOPHIL # BLD AUTO: 0.15 THOUSAND/ÂΜL (ref 0–0.61)
EOSINOPHIL NFR BLD AUTO: 2 % (ref 0–6)
ERYTHROCYTE [DISTWIDTH] IN BLOOD BY AUTOMATED COUNT: 12.8 % (ref 11.6–15.1)
GFR SERPL CREATININE-BSD FRML MDRD: 47 ML/MIN/1.73SQ M
GLUCOSE SERPL-MCNC: 97 MG/DL (ref 65–140)
HCT VFR BLD AUTO: 35.9 % (ref 34.8–46.1)
HGB BLD-MCNC: 11.4 G/DL (ref 11.5–15.4)
IMM GRANULOCYTES # BLD AUTO: 0.04 THOUSAND/UL (ref 0–0.2)
IMM GRANULOCYTES NFR BLD AUTO: 1 % (ref 0–2)
LYMPHOCYTES # BLD AUTO: 2.01 THOUSANDS/ÂΜL (ref 0.6–4.47)
LYMPHOCYTES NFR BLD AUTO: 27 % (ref 14–44)
MCH RBC QN AUTO: 31.6 PG (ref 26.8–34.3)
MCHC RBC AUTO-ENTMCNC: 31.8 G/DL (ref 31.4–37.4)
MCV RBC AUTO: 99 FL (ref 82–98)
MONOCYTES # BLD AUTO: 0.76 THOUSAND/ÂΜL (ref 0.17–1.22)
MONOCYTES NFR BLD AUTO: 10 % (ref 4–12)
NEUTROPHILS # BLD AUTO: 4.56 THOUSANDS/ÂΜL (ref 1.85–7.62)
NEUTS SEG NFR BLD AUTO: 59 % (ref 43–75)
NRBC BLD AUTO-RTO: 0 /100 WBCS
P AXIS: 58 DEGREES
PLATELET # BLD AUTO: 241 THOUSANDS/UL (ref 149–390)
PMV BLD AUTO: 9.8 FL (ref 8.9–12.7)
POTASSIUM SERPL-SCNC: 4 MMOL/L (ref 3.5–5.3)
PR INTERVAL: 162 MS
PROT SERPL-MCNC: 7.2 G/DL (ref 6.4–8.4)
QRS AXIS: -62 DEGREES
QRSD INTERVAL: 88 MS
QT INTERVAL: 366 MS
QTC INTERVAL: 440 MS
RBC # BLD AUTO: 3.61 MILLION/UL (ref 3.81–5.12)
SODIUM SERPL-SCNC: 141 MMOL/L (ref 135–147)
T WAVE AXIS: 43 DEGREES
VENTRICULAR RATE: 87 BPM
WBC # BLD AUTO: 7.59 THOUSAND/UL (ref 4.31–10.16)

## 2022-12-30 NOTE — ED PROVIDER NOTES
History  Chief Complaint   Patient presents with   • Palpitations     Since 2100  Intermittently last week and a half  Hx of afib     20-year-old woman who has a history of paroxysmal A  fib and paroxysmal SVT in the past presents after several brief episodes of palpitations today  She had 1 in the morning that lasted a couple of seconds, another in the afternoon that lasted a couple of seconds and then around 9 PM has had several hours of palpitations  She still has palpitations and on the monitor is in sinus rhythm with a rate of 87  There is no accompanying chest pain shortness of breath dizziness diaphoresis nausea vomiting arm pain neck pain abdominal pain urinary or bowel symptoms no leg pain or swelling  Prior to Admission Medications   Prescriptions Last Dose Informant Patient Reported? Taking?    LORazepam (ATIVAN) 0 5 mg tablet   Yes No   Sig: as needed   albuterol (PROVENTIL HFA,VENTOLIN HFA) 90 mcg/act inhaler   Yes No   Si puffs as needed   indomethacin (INDOCIN) 25 mg capsule   Yes No   Sig: Take 25 mg by mouth as needed for mild pain   levothyroxine 88 mcg tablet   No No   Sig: TAKE 1 TABLET BY MOUTH DAILY   meclizine (ANTIVERT) 12 5 MG tablet   Yes No   Si tablet as needed   ondansetron (ZOFRAN) 8 mg tablet   Yes No   Sig: Take by mouth every 8 (eight) hours as needed for nausea or vomiting   pantoprazole (PROTONIX) 40 mg tablet   Yes No   Sig: Take 40 mg by mouth daily   simvastatin (ZOCOR) 20 mg tablet   Yes No   Sig: Take 20 mg by mouth daily at bedtime        Facility-Administered Medications: None       Past Medical History:   Diagnosis Date   • Asthma    • Atrial fibrillation (HCC)    • Breast cyst    • Disease of thyroid gland    • Endometriosis    • Fibroid    • Gout    • Hyperlipidemia    • Hypertension    • Osteoarthritis    • Silent myocardial infarction ([facility] [address] )    • Vertigo     at times       Past Surgical History:   Procedure Laterality Date   • BREAST CYST ASPIRATION Bilateral    • CARDIAC ELECTROPHYSIOLOGY STUDY AND ABLATION     • CYST REMOVAL Bilateral     aspiration   • HYSTERECTOMY      age 41   • OOPHORECTOMY Bilateral     age 41   • REPLACEMENT TOTAL KNEE BILATERAL Bilateral    • TONSILLECTOMY AND ADENOIDECTOMY         Family History   Problem Relation Age of Onset   • Alzheimer's disease Mother    • Hypertension Father    • Heart attack Father    • Liver disease Brother         on O2   • Heart disease Brother    • No Known Problems Daughter    • Obesity Son         post gastric bypass     I have reviewed and agree with the history as documented  E-Cigarette/Vaping   • E-Cigarette Use Never User      E-Cigarette/Vaping Substances   • Nicotine No    • THC No    • CBD No    • Flavoring No    • Other No    • Unknown No      Social History     Tobacco Use   • Smoking status: Never   • Smokeless tobacco: Never   Vaping Use   • Vaping Use: Never used   Substance Use Topics   • Alcohol use: No   • Drug use: No       Review of Systems   Constitutional: Negative for fever  HENT: Negative for rhinorrhea  Eyes: Negative for visual disturbance  Respiratory: Negative for shortness of breath  Cardiovascular: Positive for palpitations  Negative for chest pain  Gastrointestinal: Negative for abdominal pain, diarrhea and vomiting  Endocrine: Negative for polydipsia  Genitourinary: Negative for dysuria, frequency and hematuria  Musculoskeletal: Negative for neck stiffness  Skin: Negative for rash  Allergic/Immunologic: Negative for immunocompromised state  Neurological: Negative for speech difficulty, weakness and numbness  Psychiatric/Behavioral: Negative for suicidal ideas  Physical Exam  Physical Exam  Constitutional:       General: She is not in acute distress  HENT:      Head: Normocephalic and atraumatic        Right Ear: External ear normal       Left Ear: External ear normal       Nose: Nose normal       Mouth/Throat:      Pharynx: Oropharynx is clear    Eyes:      Conjunctiva/sclera: Conjunctivae normal    Cardiovascular:      Rate and Rhythm: Normal rate and regular rhythm  Heart sounds: Normal heart sounds  No murmur heard  Pulmonary:      Effort: Pulmonary effort is normal       Breath sounds: Normal breath sounds  Abdominal:      General: Bowel sounds are normal       Palpations: Abdomen is soft  There is no mass  Tenderness: There is no abdominal tenderness  There is no guarding  Musculoskeletal:         General: No swelling or tenderness  Cervical back: Normal range of motion and neck supple  Right lower leg: No edema  Left lower leg: No edema  Skin:     General: Skin is warm and dry  Capillary Refill: Capillary refill takes less than 2 seconds  Neurological:      General: No focal deficit present  Mental Status: She is alert and oriented to person, place, and time     Psychiatric:         Mood and Affect: Mood normal          Vital Signs  ED Triage Vitals [12/29/22 2356]   Temperature Pulse Respirations Blood Pressure SpO2   98 8 °F (37 1 °C) 94 20 (!) 181/95 100 %      Temp Source Heart Rate Source Patient Position - Orthostatic VS BP Location FiO2 (%)   Temporal Monitor Sitting Right arm --      Pain Score       No Pain           Vitals:    12/29/22 2356 12/30/22 0030   BP: (!) 181/95 (!) 171/83   Pulse: 94 83   Patient Position - Orthostatic VS: Sitting          Visual Acuity      ED Medications  Medications - No data to display    Diagnostic Studies  Results Reviewed     Procedure Component Value Units Date/Time    HS Troponin I 2hr [[id number]]  (Normal) Collected: 12/30/22 0236    Lab Status: Final result Specimen: Blood from Arm, Right Updated: 12/30/22 0307     hs TnI 2hr 6 ng/L      Delta 2hr hsTnI 0 ng/L     Comprehensive metabolic panel [[id number]]  (Abnormal) Collected: 12/30/22 0011    Lab Status: Final result Specimen: Blood from Hand, Left Updated: 12/30/22 0052     Sodium 141 mmol/L Potassium 4 0 mmol/L      Chloride 107 mmol/L      CO2 27 mmol/L      ANION GAP 7 mmol/L      BUN 17 mg/dL      Creatinine 1 14 mg/dL      Glucose 97 mg/dL      Calcium 8 7 mg/dL      Corrected Calcium 9 3 mg/dL      AST 19 U/L      ALT 17 U/L      Alkaline Phosphatase 99 U/L      Total Protein 7 2 g/dL      Albumin 3 3 g/dL      Total Bilirubin 0 70 mg/dL      eGFR 47 ml/min/1 73sq m     Narrative:      [employer] guidelines for Chronic Kidney Disease (CKD):   •  Stage 1 with normal or high GFR (GFR > 90 mL/min/1 73 square meters)  •  Stage 2 Mild CKD (GFR = 60-89 mL/min/1 73 square meters)  •  Stage 3A Moderate CKD (GFR = 45-59 mL/min/1 73 square meters)  •  Stage 3B Moderate CKD (GFR = 30-44 mL/min/1 73 square meters)  •  Stage 4 Severe CKD (GFR = 15-29 mL/min/1 73 square meters)  •  Stage 5 End Stage CKD (GFR <15 mL/min/1 73 square meters)  Note: GFR calculation is accurate only with a steady state creatinine    HS Troponin 0hr (reflex protocol) [[id number]]  (Normal) Collected: 12/30/22 0011    Lab Status: Final result Specimen: Blood from Hand, Left Updated: 12/30/22 0043     hs TnI 0hr 6 ng/L     CBC and differential [[id number]]  (Abnormal) Collected: 12/30/22 0011    Lab Status: Final result Specimen: Blood from Hand, Left Updated: 12/30/22 0019     WBC 7 59 Thousand/uL      RBC 3 61 Million/uL      Hemoglobin 11 4 g/dL      Hematocrit 35 9 %      MCV 99 fL      MCH 31 6 pg      MCHC 31 8 g/dL      RDW 12 8 %      MPV 9 8 fL      Platelets 256 Thousands/uL      nRBC 0 /100 WBCs      Neutrophils Relative 59 %      Immat GRANS % 1 %      Lymphocytes Relative 27 %      Monocytes Relative 10 %      Eosinophils Relative 2 %      Basophils Relative 1 %      Neutrophils Absolute 4 56 Thousands/µL      Immature Grans Absolute 0 04 Thousand/uL      Lymphocytes Absolute 2 01 Thousands/µL      Monocytes Absolute 0 76 Thousand/µL      Eosinophils Absolute 0 15 Thousand/µL      Basophils Absolute 0 07 Thousands/µL                  XR chest 1 view portable   ED Interpretation by Natalie Hopper MD (12/30 0031)   Increased cardiothoracic ratio, indistinct left costophrenic recess, both unchanged since prior, nil acute changes      Final Result by Yoni Rosenberg MD (12/30 0901)      No acute cardiopulmonary disease  Workstation performed: [id number]                    Procedures  Procedures         ED Course  ED Course as of 12/30/22 1511   Fri Dec 30, 2022   0031 ECG 12 lead  SR, rate 87, axis: LAD, no acute ischemic changes                               SBIRT 22yo+    Flowsheet Row Most Recent Value   SBIRT (25 yo +)    In order to provide better care to our patients, we are screening all of our patients for alcohol and drug use  Would it be okay to ask you these screening questions? Yes Filed at: 12/29/2022 2358   Initial Alcohol Screen: US AUDIT-C     1  How often do you have a drink containing alcohol? 0 Filed at: 12/29/2022 2358   2  How many drinks containing alcohol do you have on a typical day you are drinking? 0 Filed at: 12/29/2022 2358   3b  FEMALE Any Age, or MALE 65+: How often do you have 4 or more drinks on one occassion? 0 Filed at: 12/29/2022 2358   Audit-C Score 0 Filed at: 12/29/2022 2358   BEATRIZ: How many times in the past year have you    Used an illegal drug or used a prescription medication for non-medical reasons? Never Filed at: 12/29/2022 2358                    MDM  Number of Diagnoses or Management Options  Palpitations  Diagnosis management comments: Palpitations while patient is in sinus rhythm on the monitor and on her twelve-lead  Plan is to check troponin and labs  EKG nonischemic chest x-ray no acute findings  Plan to discharge home with PMD follow-up        Disposition  Final diagnoses:   Palpitations     Time reflects when diagnosis was documented in both MDM as applicable and the Disposition within this note     Time User Action Codes Description Comment    12/30/2022 12:32 AM Cori Loose Add [R00 2] Palpitations       ED Disposition     ED Disposition   Discharge    Condition   Stable    Date/Time   Fri Dec 30, 2022  3:11 AM    Comment   Tahir Leung discharge to home/self care  Follow-up Information     Follow up With Specialties Details Why Contact Info Additional Information    Vanesa Littlejohn MD Family Medicine Schedule an appointment as soon as possible for a visit in 3 days  [address] Emergency Department Emergency Medicine  If symptoms worsen [address] [id number]  [address] [facility] Emergency Department, [address] [place] [address], [facility] [address]          Discharge Medication List as of 12/30/2022  3:11 AM      CONTINUE these medications which have NOT CHANGED    Details   albuterol (PROVENTIL HFA,VENTOLIN HFA) 90 mcg/act inhaler 2 puffs as needed, Historical Med      indomethacin (INDOCIN) 25 mg capsule Take 25 mg by mouth as needed for mild pain, Historical Med      levothyroxine 88 mcg tablet TAKE 1 TABLET BY MOUTH DAILY, Normal      LORazepam (ATIVAN) 0 5 mg tablet as needed, Starting Mon 8/23/2021, Historical Med      meclizine (ANTIVERT) 12 5 MG tablet 1 tablet as needed, Historical Med      ondansetron (ZOFRAN) 8 mg tablet Take by mouth every 8 (eight) hours as needed for nausea or vomiting, Historical Med      pantoprazole (PROTONIX) 40 mg tablet Take 40 mg by mouth daily, Starting Tue 4/30/2019, Historical Med      simvastatin (ZOCOR) 20 mg tablet Take 20 mg by mouth daily at bedtime  , Historical Med             No discharge procedures on file      PDMP Review     None          ED Provider  Electronically Signed by           Elías Samuel MD  12/30/22 5299

## 2022-12-30 NOTE — ED CARE HANDOFF
Emergency Department Sign Out Note        Sign out and transfer of care from Dr Darryl Cruz  See Separate Emergency Department note  The patient, Jenni Severe, was evaluated by the previous provider for palpitations      Workup Completed:  XR chest 1 view portable   ED Interpretation   Increased cardiothoracic ratio, indistinct left costophrenic recess, both unchanged since prior, nil acute changes         Labs Reviewed   CBC AND DIFFERENTIAL - Abnormal       Result Value Ref Range Status    WBC 7 59  4 31 - 10 16 Thousand/uL Final    RBC 3 61 (*) 3 81 - 5 12 Million/uL Final    Hemoglobin 11 4 (*) 11 5 - 15 4 g/dL Final    Hematocrit 35 9  34 8 - 46 1 % Final    MCV 99 (*) 82 - 98 fL Final    MCH 31 6  26 8 - 34 3 pg Final    MCHC 31 8  31 4 - 37 4 g/dL Final    RDW 12 8  11 6 - 15 1 % Final    MPV 9 8  8 9 - 12 7 fL Final    Platelets 031  210 - 390 Thousands/uL Final    nRBC 0  /100 WBCs Final    Neutrophils Relative 59  43 - 75 % Final    Immat GRANS % 1  0 - 2 % Final    Lymphocytes Relative 27  14 - 44 % Final    Monocytes Relative 10  4 - 12 % Final    Eosinophils Relative 2  0 - 6 % Final    Basophils Relative 1  0 - 1 % Final    Neutrophils Absolute 4 56  1 85 - 7 62 Thousands/µL Final    Immature Grans Absolute 0 04  0 00 - 0 20 Thousand/uL Final    Lymphocytes Absolute 2 01  0 60 - 4 47 Thousands/µL Final    Monocytes Absolute 0 76  0 17 - 1 22 Thousand/µL Final    Eosinophils Absolute 0 15  0 00 - 0 61 Thousand/µL Final    Basophils Absolute 0 07  0 00 - 0 10 Thousands/µL Final   COMPREHENSIVE METABOLIC PANEL - Abnormal    Sodium 141  135 - 147 mmol/L Final    Potassium 4 0  3 5 - 5 3 mmol/L Final    Chloride 107  96 - 108 mmol/L Final    CO2 27  21 - 32 mmol/L Final    ANION GAP 7  4 - 13 mmol/L Final    BUN 17  5 - 25 mg/dL Final    Creatinine 1 14  0 60 - 1 30 mg/dL Final    Comment: Standardized to IDMS reference method    Glucose 97  65 - 140 mg/dL Final    Comment: If the patient is fasting, the ADA then defines impaired fasting glucose as > 100 mg/dL and diabetes as > or equal to 123 mg/dL  Specimen collection should occur prior to Sulfasalazine administration due to the potential for falsely depressed results  Specimen collection should occur prior to Sulfapyridine administration due to the potential for falsely elevated results  Calcium 8 7  8 3 - 10 1 mg/dL Final    Corrected Calcium 9 3  8 3 - 10 1 mg/dL Final    AST 19  5 - 45 U/L Final    Comment: Specimen collection should occur prior to Sulfasalazine administration due to the potential for falsely depressed results  ALT 17  12 - 78 U/L Final    Comment: Specimen collection should occur prior to Sulfasalazine administration due to the potential for falsely depressed results  Alkaline Phosphatase 99  46 - 116 U/L Final    Total Protein 7 2  6 4 - 8 4 g/dL Final    Albumin 3 3 (*) 3 5 - 5 0 g/dL Final    Total Bilirubin 0 70  0 20 - 1 00 mg/dL Final    Comment: Use of this assay is not recommended for patients undergoing treatment with eltrombopag due to the potential for falsely elevated results      eGFR 47  ml/min/1 73sq m Final    Narrative:     [place] guidelines for Chronic Kidney Disease (CKD):   •  Stage 1 with normal or high GFR (GFR > 90 mL/min/1 73 square meters)  •  Stage 2 Mild CKD (GFR = 60-89 mL/min/1 73 square meters)  •  Stage 3A Moderate CKD (GFR = 45-59 mL/min/1 73 square meters)  •  Stage 3B Moderate CKD (GFR = 30-44 mL/min/1 73 square meters)  •  Stage 4 Severe CKD (GFR = 15-29 mL/min/1 73 square meters)  •  Stage 5 End Stage CKD (GFR <15 mL/min/1 73 square meters)  Note: GFR calculation is accurate only with a steady state creatinine   HS TROPONIN I 0HR - Normal    hs TnI 0hr 6  "Refer to ACS Flowchart"- see link ng/L Final    Comment:                                              Initial (time 0) result  If >=50 ng/L, Myocardial injury suggested ;  Type of myocardial injury and treatment strategy  to be determined  If 5-49 ng/L, a delta result at 2 hours and or 4 hours will be needed to further evaluate  If <4 ng/L, and chest pain has been >3 hours since onset, patient may qualify for discharge based on the HEART score in the ED  If <5 ng/L and <3hours since onset of chest pain, a delta result at 2 hours will be needed to further evaluate  HS Troponin 99th Percentile URL of a Health Population=12 ng/L with a 95% Confidence Interval of 8-18 ng/L  Second Troponin (time 2 hours)  If calculated delta >= 20 ng/L,  Myocardial injury suggested ; Type of myocardial injury and treatment strategy to be determined  If 5-49 ng/L and the calculated delta is 5-19 ng/L, consult medical service for evaluation  Continue evaluation for ischemia on ecg and other possible etiology and repeat hs troponin at 4 hours  If delta is <5 ng/L at 2 hours, consider discharge based on risk stratification via the HEART score (if in ED), or HERNANDO risk score in IP/Observation  HS Troponin 99th Percentile URL of a Health Population=12 ng/L with a 95% Confidence Interval of 8-18 ng/L    HS TROPONIN I 2HR - Normal    hs TnI 2hr 6  "Refer to ACS Flowchart"- see link ng/L Final    Comment:                                              Initial (time 0) result  If >=50 ng/L, Myocardial injury suggested ;  Type of myocardial injury and treatment strategy  to be determined  If 5-49 ng/L, a delta result at 2 hours and or 4 hours will be needed to further evaluate  If <4 ng/L, and chest pain has been >3 hours since onset, patient may qualify for discharge based on the HEART score in the ED  If <5 ng/L and <3hours since onset of chest pain, a delta result at 2 hours will be needed to further evaluate  HS Troponin 99th Percentile URL of a Health Population=12 ng/L with a 95% Confidence Interval of 8-18 ng/L      Second Troponin (time 2 hours)  If calculated delta >= 20 ng/L,  Myocardial injury suggested ; Type of myocardial injury and treatment strategy to be determined  If 5-49 ng/L and the calculated delta is 5-19 ng/L, consult medical service for evaluation  Continue evaluation for ischemia on ecg and other possible etiology and repeat hs troponin at 4 hours  If delta is <5 ng/L at 2 hours, consider discharge based on risk stratification via the HEART score (if in ED), or HERNANDO risk score in IP/Observation  HS Troponin 99th Percentile URL of a Health Population=12 ng/L with a 95% Confidence Interval of 8-18 ng/L  Delta 2hr hsTnI 0  <20 ng/L Final   HS TROPONIN I 4HR   LIGHT BLUE TOP        ED Course / Workup Pending (followup):                                    ED Course as of 12/30/22 0312   Fri Dec 30, 2022   0045 S/o from Dr Mushtaq Churchill  H/o PAF  NSR  No ischemic changes, no associated sx  Delta trop  If stable, can d/c home  Procedures  MDM  Number of Diagnoses or Management Options  Palpitations: new and requires workup     Amount and/or Complexity of Data Reviewed  Clinical lab tests: reviewed  Tests in the radiology section of CPT®: reviewed  Tests in the medicine section of CPT®: reviewed  Discussion of test results with the performing providers: yes  Review and summarize past medical records: yes    Risk of Complications, Morbidity, and/or Mortality  Presenting problems: low  Diagnostic procedures: low  Management options: low            Disposition  Final diagnoses:   Palpitations     Time reflects when diagnosis was documented in both MDM as applicable and the Disposition within this note     Time User Action Codes Description Comment    12/30/2022 12:32 AM Mindy Dumont Add [R00 2] Palpitations       ED Disposition     ED Disposition   Discharge    Condition   Stable    Date/Time   Fri Dec 30, 2022  3:11 AM    Jonas Leung discharge to home/self care                 Follow-up Information     Follow up With Specialties Details Why Contact Info Additional Information    Yane Iglesias MD [facility] Medicine Schedule an appointment as soon as possible for a visit in 3 days  [address] Emergency Department Emergency Medicine  If symptoms worsen 100 [place],[address]  [address] [facility] Emergency Department, [address], [place], [place] [address]        Patient's Medications   Discharge Prescriptions    No medications on file     No discharge procedures on file         ED Provider  Electronically Signed by     Sadie Colvin MD  12/30/22 2340

## 2023-02-02 ENCOUNTER — APPOINTMENT (OUTPATIENT)
Dept: LAB | Facility: CLINIC | Age: 73
End: 2023-02-02

## 2023-02-02 DIAGNOSIS — E03.9 HYPOTHYROIDISM, UNSPECIFIED TYPE: ICD-10-CM

## 2023-02-02 LAB — TSH SERPL DL<=0.05 MIU/L-ACNC: 1.93 UIU/ML (ref 0.45–4.5)

## 2023-05-02 ENCOUNTER — APPOINTMENT (OUTPATIENT)
Dept: LAB | Facility: CLINIC | Age: 73
End: 2023-05-02

## 2023-05-02 DIAGNOSIS — I10 HYPERTENSION, ESSENTIAL: ICD-10-CM

## 2023-05-02 DIAGNOSIS — E03.9 HYPOTHYROIDISM, UNSPECIFIED TYPE: ICD-10-CM

## 2023-05-02 DIAGNOSIS — N18.31 CHRONIC KIDNEY DISEASE (CKD) STAGE G3A/A1, MODERATELY DECREASED GLOMERULAR FILTRATION RATE (GFR) BETWEEN 45-59 ML/MIN/1.73 SQUARE METER AND ALBUMINURIA CREATININE RATIO LESS THAN 30 MG/G (HCC): ICD-10-CM

## 2023-05-02 DIAGNOSIS — E78.5 HYPERLIPIDEMIA, UNSPECIFIED HYPERLIPIDEMIA TYPE: ICD-10-CM

## 2023-05-02 DIAGNOSIS — F41.1 GENERALIZED ANXIETY DISORDER: ICD-10-CM

## 2023-05-03 LAB
ALBUMIN SERPL BCP-MCNC: 3.5 G/DL (ref 3.5–5)
ALP SERPL-CCNC: 71 U/L (ref 46–116)
ALT SERPL W P-5'-P-CCNC: 18 U/L (ref 12–78)
ANION GAP SERPL CALCULATED.3IONS-SCNC: 4 MMOL/L (ref 4–13)
AST SERPL W P-5'-P-CCNC: 23 U/L (ref 5–45)
BILIRUB SERPL-MCNC: 1.12 MG/DL (ref 0.2–1)
BUN SERPL-MCNC: 17 MG/DL (ref 5–25)
CALCIUM SERPL-MCNC: 9.2 MG/DL (ref 8.3–10.1)
CHLORIDE SERPL-SCNC: 108 MMOL/L (ref 96–108)
CHOLEST SERPL-MCNC: 184 MG/DL
CO2 SERPL-SCNC: 25 MMOL/L (ref 21–32)
CREAT SERPL-MCNC: 1.04 MG/DL (ref 0.6–1.3)
GFR SERPL CREATININE-BSD FRML MDRD: 52 ML/MIN/1.73SQ M
GLUCOSE P FAST SERPL-MCNC: 79 MG/DL (ref 65–99)
HDLC SERPL-MCNC: 73 MG/DL
LDLC SERPL CALC-MCNC: 97 MG/DL (ref 0–100)
NONHDLC SERPL-MCNC: 111 MG/DL
POTASSIUM SERPL-SCNC: 4.3 MMOL/L (ref 3.5–5.3)
PROT SERPL-MCNC: 7.4 G/DL (ref 6.4–8.4)
SODIUM SERPL-SCNC: 137 MMOL/L (ref 135–147)
TRIGL SERPL-MCNC: 68 MG/DL
TSH SERPL DL<=0.05 MIU/L-ACNC: 2.23 UIU/ML (ref 0.45–4.5)

## 2023-07-12 DIAGNOSIS — Z12.31 VISIT FOR SCREENING MAMMOGRAM: Primary | ICD-10-CM

## 2023-08-09 DIAGNOSIS — E03.8 HYPOTHYROIDISM DUE TO HASHIMOTO'S THYROIDITIS: ICD-10-CM

## 2023-08-09 DIAGNOSIS — E06.3 HYPOTHYROIDISM DUE TO HASHIMOTO'S THYROIDITIS: ICD-10-CM

## 2023-08-09 RX ORDER — LEVOTHYROXINE SODIUM 88 UG/1
88 TABLET ORAL DAILY
Qty: 30 TABLET | Refills: 11 | Status: SHIPPED | OUTPATIENT
Start: 2023-08-09

## 2023-08-10 ENCOUNTER — APPOINTMENT (OUTPATIENT)
Dept: LAB | Facility: CLINIC | Age: 73
End: 2023-08-10
Payer: MEDICARE

## 2023-08-10 ENCOUNTER — HOSPITAL ENCOUNTER (OUTPATIENT)
Dept: MAMMOGRAPHY | Facility: CLINIC | Age: 73
Discharge: HOME | End: 2023-08-10
Payer: MEDICARE

## 2023-08-10 VITALS — WEIGHT: 175 LBS | BODY MASS INDEX: 28.12 KG/M2 | HEIGHT: 66 IN

## 2023-08-10 DIAGNOSIS — E06.3 HYPOTHYROIDISM DUE TO HASHIMOTO'S THYROIDITIS: ICD-10-CM

## 2023-08-10 DIAGNOSIS — Z12.31 VISIT FOR SCREENING MAMMOGRAM: ICD-10-CM

## 2023-08-10 DIAGNOSIS — E03.8 HYPOTHYROIDISM DUE TO HASHIMOTO'S THYROIDITIS: ICD-10-CM

## 2023-08-10 LAB
T4 FREE SERPL-MCNC: 1.5 NG/DL (ref 0.61–1.12)
TSH SERPL DL<=0.05 MIU/L-ACNC: 1.24 UIU/ML (ref 0.45–4.5)

## 2023-08-10 PROCEDURE — 77063 BREAST TOMOSYNTHESIS BI: CPT

## 2023-08-10 PROCEDURE — 77067 SCR MAMMO BI INCL CAD: CPT

## 2023-08-10 PROCEDURE — 84439 ASSAY OF FREE THYROXINE: CPT

## 2023-08-10 PROCEDURE — 36415 COLL VENOUS BLD VENIPUNCTURE: CPT

## 2023-08-10 PROCEDURE — 84443 ASSAY THYROID STIM HORMONE: CPT

## 2023-08-28 ENCOUNTER — OFFICE VISIT (OUTPATIENT)
Dept: ENDOCRINOLOGY | Facility: HOSPITAL | Age: 73
End: 2023-08-28
Payer: MEDICARE

## 2023-08-28 VITALS
SYSTOLIC BLOOD PRESSURE: 110 MMHG | WEIGHT: 185.6 LBS | DIASTOLIC BLOOD PRESSURE: 78 MMHG | HEIGHT: 66 IN | BODY MASS INDEX: 29.83 KG/M2 | HEART RATE: 71 BPM

## 2023-08-28 DIAGNOSIS — E06.3 HYPOTHYROIDISM DUE TO HASHIMOTO'S THYROIDITIS: Primary | ICD-10-CM

## 2023-08-28 DIAGNOSIS — E03.8 HYPOTHYROIDISM DUE TO HASHIMOTO'S THYROIDITIS: Primary | ICD-10-CM

## 2023-08-28 PROCEDURE — 99213 OFFICE O/P EST LOW 20 MIN: CPT | Performed by: INTERNAL MEDICINE

## 2023-08-28 NOTE — PATIENT INSTRUCTIONS
The blood work is normal.    Continue the same levothyroxine 88 mcg daily. Follow up in 1 year with blood work.

## 2023-08-28 NOTE — PROGRESS NOTES
8/28/2023    Assessment/Plan      Diagnoses and all orders for this visit:    Hypothyroidism due to Hashimoto's thyroiditis  -     TSH, 3rd generation Lab Collect; Future  -     T4, free Lab Collect; Future        Assessment/Plan:  Hypothyroidism due to Hashimoto's thyroiditis. Recent thyroid function studies are normal.  She is biochemically and clinically euthyroid. She will continue the same levothyroxine 88 mcg daily. I have asked her to follow-up in 1 year with preceding TSH and free T4.      CC: Hypothyroid follow-up    History of Present Illness     HPI: Poonam Mcduffie is a 74y.o. year old female with history of hypothyroidism due to hashimoto's thyroiditis for follow up visit. She was diagnosed with hypothyroidism over 25 years ago. She is currently taking thyroid hormone for replacement purposes and takes levothyroxine 88 mcg daily. Had palpitations this year several times and thyroid blood work all noromal, thought to be stress induced. Weight is 4 pounds more than last visit. She does have fatigue on days when she has a poor night sleep. She denies heat or cold intolerance but can be cold in the air conditioning. She denies tremors. She has dry skin and hair loss but no brittle nails. She can have some anxiety and uses lorazepam.  She can have poor sleep with difficulty falling asleep and then other nights waking frequently and then on other nights waking at 3 AM and being unable to get back to sleep. She is constipated occasionally. She denies diarrhea. She denies compressive thyroid symptoms or difficulties with swallowing. She denies diplopia. Review of Systems   Constitutional: Positive for fatigue. Negative for unexpected weight change. Weigh 4 lbs more than last visit. Fatigue days after poor night sleep. HENT: Negative for trouble swallowing. Eyes: Negative for visual disturbance. Has diplopia. wears glasses. Lassar on right eye.  Now haze over the eye.    Respiratory: Negative for chest tightness and shortness of breath. Cardiovascular: Positive for palpitations. Negative for chest pain. Metoprolol helped palpitations. Gastrointestinal: Positive for constipation. Negative for abdominal pain, diarrhea and nausea. Occasional constipation. Endocrine: Negative for cold intolerance and heat intolerance. Cold in the [facility]. Skin: Negative for rash. Has dry skin. No brittle nails. Has hair loss. Neurological: Negative for dizziness, tremors, light-headedness and headaches. Psychiatric/Behavioral: Positive for sleep disturbance. Negative for dysphoric mood. The patient is nervous/anxious. Poor sleep and will have difficulty falling asleep, other nights wakes frequently, other nights wakes 3 am and unable to get back to sleep.  Has some anxiety, uses lorazepam.        Historical Information   Past Medical History:   Diagnosis Date   • Asthma    • Atrial fibrillation ([address])    • Breast cyst    • Disease of thyroid gland    • Endometriosis    • Fibroid    • Gout    • Hyperlipidemia    • Hypertension    • Osteoarthritis    • Silent myocardial infarction ([address])    • Vertigo     at times     Past Surgical History:   Procedure Laterality Date   • BREAST CYST ASPIRATION Bilateral    • CARDIAC ELECTROPHYSIOLOGY STUDY AND ABLATION     • CATARACT EXTRACTION Bilateral 2022   • CYST REMOVAL Bilateral     aspiration   • HYSTERECTOMY      age 41   • OOPHORECTOMY Bilateral     age 41   • REPLACEMENT TOTAL KNEE BILATERAL Bilateral    • TONSILLECTOMY AND ADENOIDECTOMY       Social History   Social History     Substance and Sexual Activity   Alcohol Use No     Social History     Substance and Sexual Activity   Drug Use No     Social History     Tobacco Use   Smoking Status Never   Smokeless Tobacco Never     Family History:   Family History   Problem Relation Age of Onset   • Alzheimer's disease Mother    • Hypertension Father    • Heart attack Father    • Liver disease Brother         on O2   • Heart disease Brother    • No Known Problems Daughter    • Obesity Son         post gastric bypass       Meds/Allergies   Current Outpatient Medications   Medication Sig Dispense Refill   • albuterol (PROVENTIL HFA,VENTOLIN HFA) 90 mcg/act inhaler 2 puffs as needed     • indomethacin (INDOCIN) 25 mg capsule Take 25 mg by mouth as needed for mild pain     • levothyroxine 88 mcg tablet Take 1 tablet (88 mcg total) by mouth daily 30 tablet 11   • LORazepam (ATIVAN) 0.5 mg tablet as needed Pt reports taking daily     • meclizine (ANTIVERT) 12.5 MG tablet 1 tablet as needed     • metoprolol succinate (TOPROL-XL) 25 mg 24 hr tablet every 24 hours     • ondansetron (ZOFRAN) 8 mg tablet Take by mouth every 8 (eight) hours as needed for nausea or vomiting     • pantoprazole (PROTONIX) 40 mg tablet Take 40 mg by mouth daily  3   • simvastatin (ZOCOR) 20 mg tablet Take 20 mg by mouth daily at bedtime         No current facility-administered medications for this visit. Allergies   Allergen Reactions   • Penicillin G      Other reaction(s): anaphylaxis   • Erythromycin      Other reaction(s): n/v       Objective   Vitals: Blood pressure 110/78, pulse 71, height 5' 6" (1.676 m), weight 84.2 kg (185 lb 9.6 oz). Invasive Devices     None                 Physical Exam  Vitals reviewed. Constitutional:       Appearance: Normal appearance. She is well-developed. HENT:      Head: Normocephalic and atraumatic. Eyes:      Extraocular Movements: Extraocular movements intact. Conjunctiva/sclera: Conjunctivae normal.      Comments: No lid lag, stare, proptosis, or periorbital edema. Neck:      Thyroid: No thyromegaly. Vascular: No carotid bruit. Comments: Thyroid normal in size. No palpable thyroid nodules. Cardiovascular:      Rate and Rhythm: Normal rate and regular rhythm. Heart sounds: Normal heart sounds. No murmur heard.   Pulmonary: Effort: Pulmonary effort is normal.      Breath sounds: Normal breath sounds. No wheezing. Abdominal:      Palpations: Abdomen is soft. Musculoskeletal:         General: No deformity. Cervical back: Normal range of motion and neck supple. Right lower leg: No edema. Left lower leg: No edema. Comments: No tremor of the outstretched hands. Lymphadenopathy:      Cervical: No cervical adenopathy. Skin:     General: Skin is warm and dry. Findings: No rash. Neurological:      Mental Status: She is alert and oriented to person, place, and time. Deep Tendon Reflexes: Reflexes are normal and symmetric. Comments: Patellar deep tendon reflexes normal.         The history was obtained from the review of the chart and from the patient. Lab Results:      Blood work performed on 8/10/2023 showed a TSH of 1.244 with a free T4 of 1.5.     Lab Results   Component Value Date    CREATININE 1.04 05/02/2023    CREATININE 1.14 12/30/2022    CREATININE 0.96 10/14/2022    BUN 17 05/02/2023     05/06/2015    K 4.3 05/02/2023     05/02/2023    CO2 25 05/02/2023     eGFR   Date Value Ref Range Status   05/02/2023 52 ml/min/1.73sq m Final         Lab Results   Component Value Date    CHOL 146 11/20/2015    HDL 73 05/02/2023    TRIG 68 05/02/2023       Lab Results   Component Value Date    ALT 18 05/02/2023    AST 23 05/02/2023    ALKPHOS 71 05/02/2023    BILITOT 0.65 05/06/2015       Lab Results   Component Value Date    FREET4 1.50 (H) 08/10/2023             Future Appointments   Date Time Provider [address]   10/16/2023  3:40 PM DO TAMIKO Watkins [facility]   8/27/2024  8:00 AM Bartolo Turk MD [facility]

## 2023-10-16 ENCOUNTER — ANNUAL EXAM (OUTPATIENT)
Dept: GYNECOLOGY | Facility: CLINIC | Age: 73
End: 2023-10-16
Payer: MEDICARE

## 2023-10-16 VITALS
HEIGHT: 65 IN | DIASTOLIC BLOOD PRESSURE: 76 MMHG | WEIGHT: 190.4 LBS | BODY MASS INDEX: 31.72 KG/M2 | SYSTOLIC BLOOD PRESSURE: 130 MMHG

## 2023-10-16 DIAGNOSIS — Z12.31 ENCOUNTER FOR SCREENING MAMMOGRAM FOR BREAST CANCER: ICD-10-CM

## 2023-10-16 DIAGNOSIS — Z01.419 ENCOUNTER FOR ANNUAL ROUTINE GYNECOLOGICAL EXAMINATION: Primary | ICD-10-CM

## 2023-10-16 PROCEDURE — G0101 CA SCREEN;PELVIC/BREAST EXAM: HCPCS | Performed by: OBSTETRICS & GYNECOLOGY

## 2023-10-16 NOTE — PROGRESS NOTES
Assessment/Plan:    She does not require a Pap    mammogram reviewed with her including breast density. RX given for next August  Discussed self breast exams    colon cancer screening - she had what sounds like FIT testing in August and it was normal.  This was done through her family dr. Radha Gupta - will wait another year or two    discussed preventive care, regular exercise and a healthy diet      No problem-specific Assessment & Plan notes found for this encounter. Diagnoses and all orders for this visit:    Encounter for annual routine gynecological examination    Encounter for screening mammogram for breast cancer  -     Mammo screening bilateral w 3d & cad; Future          Subjective:      Patient ID: Anel Gong is a 74 y.o. female. Patient here for yearly. She is not sexually active. She has no GYN complaints. Normal 3D mammogram in August with scattered fibroglandular densities and average risk. Normal DEXA in June 2021  Status post hysterectomy and BSO        The following portions of the patient's history were reviewed and updated as appropriate: allergies, current medications, past family history, past medical history, past social history, past surgical history, and problem list.    Review of Systems   Constitutional: Negative. Gastrointestinal: Negative. Genitourinary: Negative. Objective: There were no vitals taken for this visit. Physical Exam  Vitals reviewed. Constitutional:       Appearance: Normal appearance. She is well-developed. Neck:      Thyroid: No thyromegaly. Trachea: No tracheal deviation. Cardiovascular:      Rate and Rhythm: Normal rate and regular rhythm. Pulmonary:      Effort: Pulmonary effort is normal.      Breath sounds: Normal breath sounds. Chest:   Breasts:     Breasts are symmetrical.      Right: No inverted nipple, mass, nipple discharge, skin change or tenderness.       Left: No inverted nipple, mass, nipple discharge, skin change or tenderness. Abdominal:      General: There is no distension. Palpations: Abdomen is soft. There is no mass. Tenderness: There is no abdominal tenderness. Genitourinary:     Labia:         Right: No rash, tenderness, lesion or injury. Left: No rash, tenderness, lesion or injury. Vagina: Normal.      Uterus: Absent. Adnexa:         Right: No mass, tenderness or fullness. Left: No mass, tenderness or fullness. Rectum: Normal.      Comments: Vaginal cuff is atrophic  Neurological:      Mental Status: She is alert.

## 2023-10-31 ENCOUNTER — OFFICE VISIT (OUTPATIENT)
Dept: CARDIOLOGY CLINIC | Facility: CLINIC | Age: 73
End: 2023-10-31
Payer: MEDICARE

## 2023-10-31 VITALS
BODY MASS INDEX: 31.87 KG/M2 | HEART RATE: 82 BPM | DIASTOLIC BLOOD PRESSURE: 80 MMHG | SYSTOLIC BLOOD PRESSURE: 122 MMHG | WEIGHT: 188.6 LBS

## 2023-10-31 DIAGNOSIS — I47.10 PSVT (PAROXYSMAL SUPRAVENTRICULAR TACHYCARDIA): Primary | ICD-10-CM

## 2023-10-31 PROCEDURE — 99214 OFFICE O/P EST MOD 30 MIN: CPT | Performed by: INTERNAL MEDICINE

## 2023-10-31 NOTE — PROGRESS NOTES
Cardiology Follow Up    Sowmya Triana  1950  [id number]  [address] [place] [address] [place] [id number] [phone]    1. PSVT (paroxysmal supraventricular tachycardia)            Interval History: Followup PSVT    Doing well. No chest pain or palpitations. She has some mild dyspnea on exertion.      Medical Problems       Problem List       Hypothyroidism due to Hashimoto's thyroiditis        Past Medical History:   Diagnosis Date    Asthma     Atrial fibrillation (HCC)     Breast cyst     Disease of thyroid gland     Endometriosis     Fibroid     Gout     Hyperlipidemia     Hypertension     Osteoarthritis     Silent myocardial infarction ([address])     Vertigo     at times     Social History     Socioeconomic History    Marital status: /Civil Union     Spouse name: Not on file    Number of children: Not on file    Years of education: Not on file    Highest education level: Not on file   Occupational History    Occupation: [employer]   Tobacco Use    Smoking status: Never    Smokeless tobacco: Never   Vaping Use    Vaping Use: Never used   Substance and Sexual Activity    Alcohol use: No    Drug use: No    Sexual activity: Not Currently     Partners: Male   Other Topics Concern    Not on file   Social History Narrative    Not on file     Social Determinants of Health     Financial Resource Strain: Not on file   Food Insecurity: Not on file   Transportation Needs: Not on file   Physical Activity: Not on file   Stress: Not on file   Social Connections: Not on file   Intimate Partner Violence: Not on file   Housing Stability: Not on file      Family History   Problem Relation Age of Onset    Alzheimer's disease Mother     Hypertension Father     Heart attack Father     Liver disease Brother         on O2    Heart disease Brother     No Known Problems Daughter     Obesity Son         post gastric bypass Past Surgical History:   Procedure Laterality Date    BREAST CYST ASPIRATION Bilateral     CARDIAC ELECTROPHYSIOLOGY STUDY AND ABLATION      CATARACT EXTRACTION Bilateral 2022    CYST REMOVAL Bilateral     aspiration    HYSTERECTOMY      age 43    OOPHORECTOMY Bilateral     age 41    REPLACEMENT TOTAL KNEE BILATERAL Bilateral     TONSILLECTOMY AND ADENOIDECTOMY         Current Outpatient Medications:     albuterol (PROVENTIL HFA,VENTOLIN HFA) 90 mcg/act inhaler, 2 puffs as needed, Disp: , Rfl:     indomethacin (INDOCIN) 25 mg capsule, Take 25 mg by mouth as needed for mild pain, Disp: , Rfl:     levothyroxine 88 mcg tablet, Take 1 tablet (88 mcg total) by mouth daily, Disp: 30 tablet, Rfl: 11    LORazepam (ATIVAN) 0.5 mg tablet, Take 0.5 mg by mouth as needed, Disp: , Rfl:     meclizine (ANTIVERT) 12.5 MG tablet, 1 tablet as needed, Disp: , Rfl:     metoprolol succinate (TOPROL-XL) 25 mg 24 hr tablet, every 24 hours, Disp: , Rfl:     ondansetron (ZOFRAN) 8 mg tablet, Take by mouth every 8 (eight) hours as needed for nausea or vomiting, Disp: , Rfl:     pantoprazole (PROTONIX) 40 mg tablet, Take 40 mg by mouth daily, Disp: , Rfl: 3    simvastatin (ZOCOR) 20 mg tablet, Take 20 mg by mouth daily at bedtime  , Disp: , Rfl:   Allergies   Allergen Reactions    Penicillin G      Other reaction(s): anaphylaxis    Erythromycin      Other reaction(s): n/v       Labs:     Chemistry        Component Value Date/Time     05/06/2015 0804    K 4.3 05/02/2023 0708    K 4.6 05/06/2015 0804     05/02/2023 0708     05/06/2015 0804    CO2 25 05/02/2023 0708    CO2 29 05/06/2015 0804    BUN 17 05/02/2023 0708    BUN 20 05/06/2015 0804    CREATININE 1.04 05/02/2023 0708    CREATININE 0.94 05/06/2015 0804        Component Value Date/Time    CALCIUM 9.2 05/02/2023 0708    CALCIUM 8.7 05/06/2015 0804    ALKPHOS 71 05/02/2023 0708    ALKPHOS 80 05/06/2015 0804    AST 23 05/02/2023 0708    AST 21 05/06/2015 0804    ALT 18 05/02/2023 0708    ALT 25 05/06/2015 0804    BILITOT 0.65 05/06/2015 0804            Lab Results   Component Value Date    CHOL 146 11/20/2015    CHOL 169 05/06/2015    CHOL 174 10/03/2014     Lab Results   Component Value Date    HDL 73 05/02/2023    HDL 58 10/14/2022    HDL 75 10/27/2020     Lab Results   Component Value Date    LDLCALC 97 05/02/2023    LDLCALC 94 10/14/2022    LDLCALC 90 10/27/2020     Lab Results   Component Value Date    TRIG 68 05/02/2023    TRIG 61 10/14/2022    TRIG 63 10/27/2020     No results found for: "CHOLHDL"    Imaging: No results found. EKG: .    Review of Systems   Constitutional: Negative. HENT: Negative. Eyes: Negative. Cardiovascular: Negative. Respiratory: Negative. Endocrine: Negative. Hematologic/Lymphatic: Negative. Skin: Negative. Musculoskeletal: Negative. Gastrointestinal: Negative. Genitourinary: Negative. Neurological: Negative. Psychiatric/Behavioral: Negative. Allergic/Immunologic: Negative. Vitals:    10/31/23 0827   BP: 122/80   Pulse: 82           Physical Exam  Vitals and nursing note reviewed. Constitutional:       Appearance: Normal appearance. HENT:      Head: Normocephalic. Nose: Nose normal.      Mouth/Throat:      Mouth: Mucous membranes are moist.   Eyes:      General: No scleral icterus. Conjunctiva/sclera: Conjunctivae normal.   Cardiovascular:      Rate and Rhythm: Normal rate and regular rhythm. Heart sounds: No murmur heard. No gallop. Pulmonary:      Effort: Pulmonary effort is normal. No respiratory distress. Breath sounds: Normal breath sounds. No wheezing or rales. Abdominal:      General: Abdomen is flat. Bowel sounds are normal. There is no distension. Palpations: Abdomen is soft. Tenderness: There is no abdominal tenderness. There is no guarding. Musculoskeletal:      Cervical back: Normal range of motion and neck supple. Right lower leg: No edema. Left lower leg: No edema. Skin:     General: Skin is warm and dry. Neurological:      General: No focal deficit present. Mental Status: She is alert and oriented to person, place, and time. Psychiatric:         Mood and Affect: Mood normal.         Behavior: Behavior normal.         Discussion/Summary:    Dyslipidemia: Continue with simvastatin. LDL is 97. PSVT:  Hx of SVT ablation in 1998. She is doing well on metoprolol and asymptomatic. The patient was counseled regarding diagnostic results, instructions for management, risk factor reductions, impressions. total time of encounter was 25 minutes and 15 minutes was spent counseling.

## 2024-04-18 ENCOUNTER — APPOINTMENT (OUTPATIENT)
Dept: LAB | Facility: CLINIC | Age: 74
End: 2024-04-18
Payer: MEDICARE

## 2024-04-18 DIAGNOSIS — E78.5 HYPERLIPIDEMIA, UNSPECIFIED HYPERLIPIDEMIA TYPE: ICD-10-CM

## 2024-04-18 DIAGNOSIS — I10 ESSENTIAL HYPERTENSION, MALIGNANT: ICD-10-CM

## 2024-04-18 DIAGNOSIS — F41.1 GENERALIZED ANXIETY DISORDER: ICD-10-CM

## 2024-04-18 LAB
ALBUMIN SERPL BCP-MCNC: 3.9 G/DL (ref 3.5–5)
ALP SERPL-CCNC: 66 U/L (ref 34–104)
ALT SERPL W P-5'-P-CCNC: 11 U/L (ref 7–52)
ANION GAP SERPL CALCULATED.3IONS-SCNC: 8 MMOL/L (ref 4–13)
AST SERPL W P-5'-P-CCNC: 20 U/L (ref 13–39)
BILIRUB SERPL-MCNC: 0.95 MG/DL (ref 0.2–1)
BUN SERPL-MCNC: 18 MG/DL (ref 5–25)
CALCIUM SERPL-MCNC: 9.1 MG/DL (ref 8.4–10.2)
CHLORIDE SERPL-SCNC: 102 MMOL/L (ref 96–108)
CHOLEST SERPL-MCNC: 167 MG/DL
CO2 SERPL-SCNC: 28 MMOL/L (ref 21–32)
CREAT SERPL-MCNC: 0.94 MG/DL (ref 0.6–1.3)
GFR SERPL CREATININE-BSD FRML MDRD: 59 ML/MIN/1.73SQ M
GLUCOSE P FAST SERPL-MCNC: 63 MG/DL (ref 65–99)
HDLC SERPL-MCNC: 58 MG/DL
LDLC SERPL CALC-MCNC: 95 MG/DL (ref 0–100)
NONHDLC SERPL-MCNC: 109 MG/DL
POTASSIUM SERPL-SCNC: 4.3 MMOL/L (ref 3.5–5.3)
PROT SERPL-MCNC: 7.4 G/DL (ref 6.4–8.4)
SODIUM SERPL-SCNC: 138 MMOL/L (ref 135–147)
TRIGL SERPL-MCNC: 70 MG/DL

## 2024-04-18 PROCEDURE — 80053 COMPREHEN METABOLIC PANEL: CPT

## 2024-04-18 PROCEDURE — 36415 COLL VENOUS BLD VENIPUNCTURE: CPT

## 2024-04-18 PROCEDURE — 80061 LIPID PANEL: CPT

## 2024-04-24 ENCOUNTER — HOSPITAL ENCOUNTER (EMERGENCY)
Facility: HOSPITAL | Age: 74
Discharge: HOME | End: 2024-04-24
Attending: EMERGENCY MEDICINE
Payer: MEDICARE

## 2024-04-24 ENCOUNTER — APPOINTMENT (EMERGENCY)
Dept: CT IMAGING | Facility: HOSPITAL | Age: 74
End: 2024-04-24
Payer: MEDICARE

## 2024-04-24 VITALS
OXYGEN SATURATION: 100 % | SYSTOLIC BLOOD PRESSURE: 170 MMHG | HEART RATE: 68 BPM | TEMPERATURE: 98.2 F | DIASTOLIC BLOOD PRESSURE: 82 MMHG | RESPIRATION RATE: 18 BRPM

## 2024-04-24 DIAGNOSIS — S00.83XA FACIAL CONTUSION: ICD-10-CM

## 2024-04-24 DIAGNOSIS — S00.31XA ABRASION OF NOSE: ICD-10-CM

## 2024-04-24 DIAGNOSIS — S01.511A LACERATION OF LIP: ICD-10-CM
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
## 2024-04-24 DIAGNOSIS — S09.90XA HEAD INJURY: ICD-10-CM

## 2024-04-24 PROCEDURE — 70450 CT HEAD/BRAIN W/O DYE: CPT

## 2024-04-24 PROCEDURE — 90471 IMMUNIZATION ADMIN: CPT

## 2024-04-24 PROCEDURE — 70486 CT MAXILLOFACIAL W/O DYE: CPT

## 2024-04-24 PROCEDURE — 99284 EMERGENCY DEPT VISIT MOD MDM: CPT | Performed by: PHYSICIAN ASSISTANT

## 2024-04-24 PROCEDURE — 90715 TDAP VACCINE 7 YRS/> IM: CPT | Performed by: PHYSICIAN ASSISTANT

## 2024-04-24 PROCEDURE — 12011 RPR F/E/E/N/L/M 2.5 CM/<: CPT | Performed by: PHYSICIAN ASSISTANT

## 2024-04-24 PROCEDURE — 99284 EMERGENCY DEPT VISIT MOD MDM: CPT

## 2024-04-24 RX ORDER — LIDOCAINE HYDROCHLORIDE 10 MG/ML
5 INJECTION, SOLUTION EPIDURAL; INFILTRATION; INTRACAUDAL; PERINEURAL ONCE
Status: DISCONTINUED | OUTPATIENT
Start: 2024-04-24 | End: 2024-04-24 | Stop reason: HOSPADM

## 2024-04-24 RX ADMIN — TETANUS TOXOID, REDUCED DIPHTHERIA TOXOID AND ACELLULAR PERTUSSIS VACCINE, ADSORBED 0.5 ML: 5; 2.5; 8; 8; 2.5 SUSPENSION INTRAMUSCULAR at 16:19

## 2024-04-24 NOTE — DISCHARGE INSTRUCTIONS
Rest, elevate head of bed.  Tylenol for discomfort.  If change in behavior  or vomiting return to ER.  Keep wound dry for next 24 hours, then clean daily with water.  Follow up with family doctor in 3-4 days for recheck.

## 2024-04-24 NOTE — ED PROVIDER NOTES
History  Chief Complaint   Patient presents with    Fall     Pt tripped on side walk falling face down, pt struck her face, nose, and split her lip. Pt denies loc or blood thinner.     Patient is a 75 y/o F with h/o afib, HTN, Hyperlipidemia that presents to the ED with lip laceration after a fall that occurred 3 hours ago.  Patient states she was at her daughter's house and tripped on the pavement and fell forward, hitting face on the ground.  She has a laceration to lip and abrasion to her nose. She denies LOC.  No headache, nausea or vomiting.  She denies any other injuries, but does have bruising to her left knee.  She is able to ambulate without difficulty. Last tetanus is unknown.  SHe is alert and oriented x3.  No anticoagulants.        History provided by:  Patient  Fall  Associated symptoms: no back pain, no chest pain, no headaches, no nausea, no neck pain and no vomiting        Prior to Admission Medications   Prescriptions Last Dose Informant Patient Reported? Taking?   LORazepam (ATIVAN) 0.5 mg tablet  Self Yes No   Sig: Take 0.5 mg by mouth as needed   albuterol (PROVENTIL HFA,VENTOLIN HFA) 90 mcg/act inhaler  Self Yes No   Si puffs as needed   indomethacin (INDOCIN) 25 mg capsule  Self Yes No   Sig: Take 25 mg by mouth as needed for mild pain   levothyroxine 88 mcg tablet  Self No No   Sig: Take 1 tablet (88 mcg total) by mouth daily   meclizine (ANTIVERT) 12.5 MG tablet  Self Yes No   Si tablet as needed   metoprolol succinate (TOPROL-XL) 25 mg 24 hr tablet  Self Yes No   Sig: every 24 hours   ondansetron (ZOFRAN) 8 mg tablet  Self Yes No   Sig: Take by mouth every 8 (eight) hours as needed for nausea or vomiting   pantoprazole (PROTONIX) 40 mg tablet  Self Yes No   Sig: Take 40 mg by mouth daily   simvastatin (ZOCOR) 20 mg tablet  Self Yes No   Sig: Take 20 mg by mouth daily at bedtime        Facility-Administered Medications: None       Past Medical History:   Diagnosis Date    Asthma      Atrial fibrillation (HCC)     Breast cyst     Disease of thyroid gland     Endometriosis     Fibroid     Gout     Hyperlipidemia     Hypertension     Osteoarthritis     Silent myocardial infarction (HCC)     Vertigo     at times       Past Surgical History:   Procedure Laterality Date    BREAST CYST ASPIRATION Bilateral     CARDIAC ELECTROPHYSIOLOGY STUDY AND ABLATION      CATARACT EXTRACTION Bilateral 2022    CYST REMOVAL Bilateral     aspiration    HYSTERECTOMY      age 40    OOPHORECTOMY Bilateral     age 40    REPLACEMENT TOTAL KNEE BILATERAL Bilateral     TONSILLECTOMY AND ADENOIDECTOMY         Family History   Problem Relation Age of Onset    Alzheimer's disease Mother     Hypertension Father     Heart attack Father     Liver disease Brother         on O2    Heart disease Brother     No Known Problems Daughter     Obesity Son         post gastric bypass     I have reviewed and agree with the history as documented.    E-Cigarette/Vaping    E-Cigarette Use Never User      E-Cigarette/Vaping Substances    Nicotine No     THC No     CBD No     Flavoring No     Other No     Unknown No      Social History     Tobacco Use    Smoking status: Never    Smokeless tobacco: Never   Vaping Use    Vaping status: Never Used   Substance Use Topics    Alcohol use: No    Drug use: No       Review of Systems   Constitutional:  Negative for chills and fever.   Eyes:  Negative for visual disturbance.   Respiratory:  Negative for cough and shortness of breath.    Cardiovascular:  Negative for chest pain.   Gastrointestinal:  Negative for nausea and vomiting.   Musculoskeletal:  Negative for back pain and neck pain.   Skin:  Positive for wound.   Neurological:  Negative for facial asymmetry and headaches.   Psychiatric/Behavioral:  Negative for confusion and decreased concentration.    All other systems reviewed and are negative.      Physical Exam  Physical Exam  Vitals and nursing note reviewed.   Constitutional:       General: She  is not in acute distress.     Appearance: Normal appearance. She is well-developed, well-groomed and normal weight. She is not ill-appearing or diaphoretic.   HENT:      Head: Normocephalic and atraumatic.      Jaw: There is normal jaw occlusion. No trismus, tenderness or pain on movement.      Right Ear: External ear normal.      Left Ear: External ear normal.      Nose: Nose normal.      Mouth/Throat:      Mouth: Mucous membranes are moist.      Dentition: Dental tenderness present.      Pharynx: Oropharynx is clear.        Comments: No dental fracture.   Eyes:      General: Lids are normal.      Extraocular Movements: Extraocular movements intact.      Conjunctiva/sclera: Conjunctivae normal.      Pupils: Pupils are equal, round, and reactive to light.   Cardiovascular:      Rate and Rhythm: Normal rate and regular rhythm.   Pulmonary:      Effort: Pulmonary effort is normal.      Breath sounds: Normal breath sounds.   Chest:      Chest wall: No tenderness.   Musculoskeletal:      Cervical back: Normal, normal range of motion and neck supple. No spinous process tenderness or muscular tenderness.      Thoracic back: Normal.      Lumbar back: Normal.      Comments: B/L UE and LE FROM, nontender to palpation.  There is ecchymosis to left knee.    Skin:     General: Skin is warm and dry.      Coloration: Skin is not pale.      Findings: Bruising (left knee) and laceration present. Erythema: upper lip.  Neurological:      Mental Status: She is alert and oriented to person, place, and time.      GCS: GCS eye subscore is 4. GCS verbal subscore is 5. GCS motor subscore is 6.      Cranial Nerves: Cranial nerves 2-12 are intact.      Sensory: Sensation is intact.      Motor: Motor function is intact.      Gait: Gait is intact.   Psychiatric:         Mood and Affect: Mood normal.         Behavior: Behavior is cooperative.         Vital Signs  ED Triage Vitals   Temperature Pulse Respirations Blood Pressure SpO2   04/24/24  1458 04/24/24 1458 04/24/24 1458 04/24/24 1459 04/24/24 1459   98.2 °F (36.8 °C) 68 18 170/82 100 %      Temp src Heart Rate Source Patient Position - Orthostatic VS BP Location FiO2 (%)   -- -- -- -- --             Pain Score       04/24/24 1459       2           Vitals:    04/24/24 1458 04/24/24 1459   BP:  170/82   Pulse: 68          Visual Acuity      ED Medications  Medications   lidocaine (PF) (XYLOCAINE-MPF) 1 % injection 5 mL (5 mL Infiltration Handoff 4/24/24 1715)   tetanus-diphtheria-acellular pertussis (BOOSTRIX) IM injection 0.5 mL (0.5 mL Intramuscular Given 4/24/24 1619)       Diagnostic Studies  Results Reviewed       None                   CT head without contrast   Final Result by Ariane Dee MD (04/24 1644)      No acute intracranial hemorrhage seen   No mass effect seen                  Workstation performed: [id number]         CT facial bones without contrast   Final Result by Dilan Guzman MD (04/24 1655)      No evidence of acute traumatic injury to the facial bones.               Workstation performed: [id number]                    Procedures  Universal Protocol:  Procedure performed by: (sutures performed by Melanie DIEHL under my direct supervision.)  Consent: Verbal consent obtained.  Consent given by: patient  Patient identity confirmed: verbally with patient  Laceration repair    Date/Time: 4/24/2024 4:37 PM    Performed by: Liliana Long PA-C  Authorized by: Liliana Long PA-C  Body area: head/neck  Location details: upper lip  Vermilion border involved: no  Lip laceration height: up to half vertical height  Laceration length: 1.5 cm  Foreign bodies: no foreign bodies  Tendon involvement: none  Nerve involvement: none  Vascular damage: no  Anesthesia: local infiltration    Anesthesia:  Local Anesthetic: lidocaine 1% without epinephrine      Procedure Details:  Preparation: Patient was prepped and draped in the usual sterile fashion.  Irrigation  solution: saline  Irrigation method: tap  Amount of cleaning: standard  Subcutaneous closure: 5-0 Vicryl  Number of sutures: 3  Technique: simple  Approximation: close  Approximation difficulty: simple  Patient tolerance: patient tolerated the procedure well with no immediate complications               ED Course                               SBIRT 22yo+      Flowsheet Row Most Recent Value   Initial Alcohol Screen: US AUDIT-C     1. How often do you have a drink containing alcohol? 0 Filed at: 04/24/2024 1548   2. How many drinks containing alcohol do you have on a typical day you are drinking?  0 Filed at: 04/24/2024 1548   3a. Male UNDER 65: How often do you have five or more drinks on one occasion? 0 Filed at: 04/24/2024 1548   3b. FEMALE Any Age, or MALE 65+: How often do you have 4 or more drinks on one occassion? 0 Filed at: 04/24/2024 1548   Audit-C Score 0 Filed at: 04/24/2024 1548   BEATRIZ: How many times in the past year have you...    Used an illegal drug or used a prescription medication for non-medical reasons? Never Filed at: 04/24/2024 1548                      Medical Decision Making  Patient with head injury, facial contusion, will order CT scan to r/o hemorrhage, facial bone fracture.  Patient with laceration to lip, will suture and update tetanus.  Head injury instructions with return precautions given.     Amount and/or Complexity of Data Reviewed  Radiology: ordered.    Risk  Prescription drug management.             Disposition  Final diagnoses:   Fall   Head injury   Facial contusion   Laceration of lip   Abrasion of nose     Time reflects when diagnosis was documented in both MDM as applicable and the Disposition within this note       Time User Action Codes Description Comment    4/24/2024  4:50 PM Liliana Long Add [W19.XXXA] Fall     4/24/2024  4:50 PM Liliana Long Add [S09.90XA] Head injury     4/24/2024  4:50 PM Liliana Long Add [S00.83XA] Facial contusion      4/24/2024  4:50 PM Liliana Long [S01.511A] Laceration of lip     4/24/2024  4:50 PM Liliana Long [S00.31XA] Abrasion of nose           ED Disposition       ED Disposition   Discharge    Condition   Stable    Date/Time   Wed Apr 24, 2024 1708    Comment   Demetria MCMAHON Xochitl discharge to home/self care.                   Follow-up Information       Follow up With Specialties Details Why Contact Info    Popeye Pike MD Family Medicine Schedule an appointment as soon as possible for a visit in 3 days For recheck [address] [place] [id number]  [phone]              Discharge Medication List as of 4/24/2024  5:08 PM        CONTINUE these medications which have NOT CHANGED    Details   albuterol (PROVENTIL HFA,VENTOLIN HFA) 90 mcg/act inhaler 2 puffs as needed, Historical Med      indomethacin (INDOCIN) 25 mg capsule Take 25 mg by mouth as needed for mild pain, Historical Med      levothyroxine 88 mcg tablet Take 1 tablet (88 mcg total) by mouth daily, Starting Wed 8/9/2023, Normal      LORazepam (ATIVAN) 0.5 mg tablet Take 0.5 mg by mouth as needed, Starting Mon 8/23/2021, Historical Med      meclizine (ANTIVERT) 12.5 MG tablet 1 tablet as needed, Historical Med      metoprolol succinate (TOPROL-XL) 25 mg 24 hr tablet every 24 hours, Historical Med      ondansetron (ZOFRAN) 8 mg tablet Take by mouth every 8 (eight) hours as needed for nausea or vomiting, Historical Med      pantoprazole (PROTONIX) 40 mg tablet Take 40 mg by mouth daily, Starting Tue 4/30/2019, Historical Med      simvastatin (ZOCOR) 20 mg tablet Take 20 mg by mouth daily at bedtime  , Historical Med             No discharge procedures on file.    PDMP Review       None            ED Provider  Electronically Signed by             Liliana Long PA-C  04/24/24 3960

## 2024-04-30 ENCOUNTER — APPOINTMENT (OUTPATIENT)
Dept: RADIOLOGY | Facility: CLINIC | Age: 74
End: 2024-04-30
Payer: MEDICARE

## 2024-04-30 DIAGNOSIS — Z91.81 PERSONAL HISTORY OF FALL: ICD-10-CM

## 2024-04-30 DIAGNOSIS — M25.532 LEFT WRIST PAIN: ICD-10-CM

## 2024-04-30 PROCEDURE — 73110 X-RAY EXAM OF WRIST: CPT

## 2024-05-31 ENCOUNTER — OFFICE VISIT (OUTPATIENT)
Dept: PLASTIC SURGERY | Facility: HOSPITAL | Age: 74
End: 2024-05-31
Payer: MEDICARE

## 2024-05-31 VITALS
SYSTOLIC BLOOD PRESSURE: 141 MMHG | WEIGHT: 182 LBS | BODY MASS INDEX: 30.32 KG/M2 | TEMPERATURE: 98.3 F | HEIGHT: 65 IN | DIASTOLIC BLOOD PRESSURE: 83 MMHG | HEART RATE: 64 BPM

## 2024-05-31 DIAGNOSIS — K13.0 MASS OF LIP: Primary | ICD-10-CM

## 2024-05-31 PROCEDURE — 99203 OFFICE O/P NEW LOW 30 MIN: CPT | Performed by: SURGERY

## 2024-05-31 NOTE — PROGRESS NOTES
Assessment/Plan: See HPI.  I discussed the excision of the subcutaneous mass of the lower lip, how the procedure would be performed, as well as potential risk, complications and limitations.  Given her recent experience with local anesthesia to the upper lip at the time of her laceration repair, she strongly prefers general anesthesia.  She will work with our coordinator to arrange a date for the procedure.  Consent was obtained at today's visit.     There are no diagnoses linked to this encounter.     Subjective: Mass of lower lip     Patient ID: Demetria Leung is a 74 y.o. female.    HPI Demetria is a very pleasant 76-year-old female self-referred, who is being seen today in regard to a slowly enlarging mass of the right lower lip.  She points out (intraorally) the area of prominence of the lower lip where there is a visible, palpable subcutaneous mass.    Review of Systems   Constitutional:  Negative for chills and fever.   HENT:  Negative for hearing loss.    Eyes:  Positive for visual disturbance. Negative for discharge.   Respiratory:  Negative for chest tightness and shortness of breath.         History of asthma, shortness of breath with exertion   Cardiovascular:  Negative for chest pain and leg swelling.   Gastrointestinal:  Negative for blood in stool, constipation, diarrhea and nausea.   Genitourinary:  Negative for dysuria.   Musculoskeletal:  Negative for gait problem.   Skin:  Negative for rash.   Allergic/Immunologic: Negative for immunocompromised state.   Neurological:  Negative for seizures and headaches.   Hematological:  Does not bruise/bleed easily.   Psychiatric/Behavioral:  Negative for dysphoric mood. The patient is nervous/anxious.        Objective:     Physical Exam  Constitutional:       Appearance: Normal appearance.   HENT:      Head: Normocephalic and atraumatic.      Mouth/Throat:      Mouth: Mucous membranes are moist.      Comments: Subcutaneous mass right lower lip, with bulging of  mucosa, firm, nontender approximately 1.5 to 2 cm mass, clinically consistent with mucocele, see photo in media  Eyes:      Extraocular Movements: Extraocular movements intact.      Pupils: Pupils are equal, round, and reactive to light.   Cardiovascular:      Rate and Rhythm: Normal rate.   Pulmonary:      Effort: Pulmonary effort is normal.   Abdominal:      Palpations: Abdomen is soft.   Musculoskeletal:         General: Normal range of motion.      Cervical back: Normal range of motion and neck supple.   Skin:     General: Skin is warm.   Neurological:      Mental Status: She is alert and oriented to person, place, and time.   Psychiatric:         Mood and Affect: Mood normal.

## 2024-06-14 ENCOUNTER — PREP FOR PROCEDURE (OUTPATIENT)
Dept: PLASTIC SURGERY | Facility: CLINIC | Age: 74
End: 2024-06-14

## 2024-06-14 DIAGNOSIS — R22.9 SUBCUTANEOUS MASS: Primary | ICD-10-CM

## 2024-07-08 ENCOUNTER — APPOINTMENT (OUTPATIENT)
Dept: LAB | Facility: CLINIC | Age: 74
End: 2024-07-08
Payer: MEDICARE

## 2024-07-08 DIAGNOSIS — R22.9 SUBCUTANEOUS MASS: ICD-10-CM

## 2024-07-08 LAB
ANION GAP SERPL CALCULATED.3IONS-SCNC: 7 MMOL/L (ref 4–13)
BASOPHILS # BLD AUTO: 0.09 THOUSANDS/ÂΜL (ref 0–0.1)
BASOPHILS NFR BLD AUTO: 2 % (ref 0–1)
BUN SERPL-MCNC: 20 MG/DL (ref 5–25)
CALCIUM SERPL-MCNC: 8.9 MG/DL (ref 8.4–10.2)
CHLORIDE SERPL-SCNC: 102 MMOL/L (ref 96–108)
CO2 SERPL-SCNC: 26 MMOL/L (ref 21–32)
CREAT SERPL-MCNC: 0.95 MG/DL (ref 0.6–1.3)
EOSINOPHIL # BLD AUTO: 0.36 THOUSAND/ÂΜL (ref 0–0.61)
EOSINOPHIL NFR BLD AUTO: 6 % (ref 0–6)
ERYTHROCYTE [DISTWIDTH] IN BLOOD BY AUTOMATED COUNT: 12.8 % (ref 11.6–15.1)
GFR SERPL CREATININE-BSD FRML MDRD: 58 ML/MIN/1.73SQ M
GLUCOSE P FAST SERPL-MCNC: 79 MG/DL (ref 65–99)
HCT VFR BLD AUTO: 37.8 % (ref 34.8–46.1)
HGB BLD-MCNC: 11.9 G/DL (ref 11.5–15.4)
IMM GRANULOCYTES # BLD AUTO: 0.03 THOUSAND/UL (ref 0–0.2)
IMM GRANULOCYTES NFR BLD AUTO: 1 % (ref 0–2)
LYMPHOCYTES # BLD AUTO: 1.66 THOUSANDS/ÂΜL (ref 0.6–4.47)
LYMPHOCYTES NFR BLD AUTO: 27 % (ref 14–44)
MCH RBC QN AUTO: 31.5 PG (ref 26.8–34.3)
MCHC RBC AUTO-ENTMCNC: 31.5 G/DL (ref 31.4–37.4)
MCV RBC AUTO: 100 FL (ref 82–98)
MONOCYTES # BLD AUTO: 0.59 THOUSAND/ÂΜL (ref 0.17–1.22)
MONOCYTES NFR BLD AUTO: 10 % (ref 4–12)
NEUTROPHILS # BLD AUTO: 3.43 THOUSANDS/ÂΜL (ref 1.85–7.62)
NEUTS SEG NFR BLD AUTO: 54 % (ref 43–75)
NRBC BLD AUTO-RTO: 0 /100 WBCS
PLATELET # BLD AUTO: 198 THOUSANDS/UL (ref 149–390)
PMV BLD AUTO: 10.8 FL (ref 8.9–12.7)
POTASSIUM SERPL-SCNC: 4.4 MMOL/L (ref 3.5–5.3)
RBC # BLD AUTO: 3.78 MILLION/UL (ref 3.81–5.12)
SODIUM SERPL-SCNC: 135 MMOL/L (ref 135–147)
WBC # BLD AUTO: 6.16 THOUSAND/UL (ref 4.31–10.16)

## 2024-07-08 PROCEDURE — 36415 COLL VENOUS BLD VENIPUNCTURE: CPT

## 2024-07-08 PROCEDURE — 85025 COMPLETE CBC W/AUTO DIFF WBC: CPT

## 2024-07-08 PROCEDURE — 80048 BASIC METABOLIC PNL TOTAL CA: CPT

## 2024-07-09 ENCOUNTER — ANESTHESIA EVENT (OUTPATIENT)
Dept: PERIOP | Facility: HOSPITAL | Age: 74
End: 2024-07-09
Payer: MEDICARE

## 2024-07-12 NOTE — PRE-PROCEDURE INSTRUCTIONS
Pre-Surgery Instructions:   Medication Instructions    albuterol (PROVENTIL HFA,VENTOLIN HFA) 90 mcg/act inhaler Uses PRN- OK to take day of surgery    indomethacin (INDOCIN) 25 mg capsule Uses PRN- OK to take day of surgery    levothyroxine 88 mcg tablet Take day of surgery.    LORazepam (ATIVAN) 0.5 mg tablet Uses PRN- OK to take day of surgery    meclizine (ANTIVERT) 12.5 MG tablet Uses PRN- OK to take day of surgery    metoprolol succinate (TOPROL-XL) 25 mg 24 hr tablet Take day of surgery.    ondansetron (ZOFRAN) 8 mg tablet Uses PRN- OK to take day of surgery    pantoprazole (PROTONIX) 40 mg tablet Take day of surgery.    simvastatin (ZOCOR) 20 mg tablet Take day of surgery.   Medication instructions for day surgery reviewed. Please use only a sip of water to take your instructed medications. Avoid all over the counter vitamins, supplements and NSAIDS for one week prior to surgery per anesthesia guidelines. Tylenol is ok to take as needed.     You will receive a call one business day prior to surgery with an arrival time and hospital directions. If your surgery is scheduled on a Monday, the hospital will be calling you on the Friday prior to your surgery. If you have not heard from anyone by 8pm, please call the hospital supervisor through the hospital  at [phone]. ([place] [phone] or [place] [phone]).    Do not eat or drink anything after midnight the night before your surgery, including candy, mints, lifesavers, or chewing gum. Do not drink alcohol 24hrs before your surgery. Try not to smoke at least 24hrs before your surgery.       Follow the pre surgery showering instructions as listed in the “My Surgical Experience Booklet” or otherwise provided by your surgeon's office. Do not use a blade to shave the surgical area 1 week before surgery. It is okay to use a clean electric clippers up to 24 hours before surgery. Do not apply any lotions, creams, including makeup, cologne,  deodorant, or perfumes after showering on the day of your surgery. Do not use dry shampoo, hair spray, hair gel, or any type of hair products.     No contact lenses, eye make-up, or artificial eyelashes. Remove nail polish, including gel polish, and any artificial, gel, or acrylic nails if possible. Remove all jewelry including rings and body piercing jewelry.     Wear causal clothing that is easy to take on and off. Consider your type of surgery.    Keep any valuables, jewelry, piercings at home. Please bring any specially ordered equipment (sling, braces) if indicated.    Arrange for a responsible person to drive you to and from the hospital on the day of your surgery. Please confirm the visitor policy for the day of your procedure when you receive your phone call with an arrival time.     Call the surgeon's office with any new illnesses, exposures, or additional questions prior to surgery.    Please reference your “My Surgical Experience Booklet” for additional information to prepare for your upcoming surgery.

## 2024-07-17 NOTE — ANESTHESIA PREPROCEDURE EVALUATION
Procedure:  EXCISION OF SUBCUTANEOUS MASS OF LOWER LIP, COMPLEX CLOSURE VS LOCAL FLAP RECONSTRUCTION (Head)    Relevant Problems   ANESTHESIA   (+) PONV (postoperative nausea and vomiting)      CARDIO   (+) Atrial fibrillation (HCC)   (+) Hyperlipidemia   (+) Hypertension   (+) Silent myocardial infarction (HCC)      ENDO   (+) Hypothyroidism due to Hashimoto's thyroiditis      PULMONARY   (+) Asthma        Physical Exam    Airway  Comment: Small mouth  Mallampati score: III  TM Distance: <3 FB  Neck ROM: full     Dental   No notable dental hx     Cardiovascular      Pulmonary      Other Findings  post-pubertal.      Anesthesia Plan  ASA Score- 3     Anesthesia Type- general with ASA Monitors.         Additional Monitors:     Airway Plan: ETT.           Plan Factors-    Chart reviewed. EKG reviewed.  Existing labs reviewed. Patient summary reviewed.    Patient is not a current smoker.              Induction- intravenous.    Postoperative Plan- Plan for postoperative opioid use.         Informed Consent- Anesthetic plan and risks discussed with patient.  I personally reviewed this patient with the CRNA. Discussed and agreed on the Anesthesia Plan with the CRNA..

## 2024-07-18 ENCOUNTER — HOSPITAL ENCOUNTER (OUTPATIENT)
Facility: HOSPITAL | Age: 74
Setting detail: OUTPATIENT SURGERY
Discharge: HOME | End: 2024-07-18
Attending: SURGERY | Admitting: SURGERY
Payer: MEDICARE

## 2024-07-18 ENCOUNTER — ANESTHESIA (OUTPATIENT)
Dept: PERIOP | Facility: HOSPITAL | Age: 74
End: 2024-07-18
Payer: MEDICARE

## 2024-07-18 VITALS
BODY MASS INDEX: 37.15 KG/M2 | HEIGHT: 65 IN | OXYGEN SATURATION: 97 % | DIASTOLIC BLOOD PRESSURE: 74 MMHG | TEMPERATURE: 97 F | SYSTOLIC BLOOD PRESSURE: 153 MMHG | WEIGHT: 223 LBS | HEART RATE: 62 BPM | RESPIRATION RATE: 20 BRPM

## 2024-07-18 DIAGNOSIS — R22.9 SUBCUTANEOUS MASS: ICD-10-CM

## 2024-07-18 PROCEDURE — 40814 EXCISE/REPAIR MOUTH LESION: CPT | Performed by: SURGERY

## 2024-07-18 PROCEDURE — 88305 TISSUE EXAM BY PATHOLOGIST: CPT | Performed by: STUDENT IN AN ORGANIZED HEALTH CARE EDUCATION/TRAINING PROGRAM

## 2024-07-18 PROCEDURE — 40814 EXCISE/REPAIR MOUTH LESION: CPT | Performed by: PHYSICIAN ASSISTANT

## 2024-07-18 RX ORDER — PROPOFOL 10 MG/ML
INJECTION, EMULSION INTRAVENOUS AS NEEDED
Status: DISCONTINUED | OUTPATIENT
Start: 2024-07-18 | End: 2024-07-18

## 2024-07-18 RX ORDER — CLINDAMYCIN PHOSPHATE 900 MG/50ML
900 INJECTION, SOLUTION INTRAVENOUS ONCE
Status: COMPLETED | OUTPATIENT
Start: 2024-07-18 | End: 2024-07-18

## 2024-07-18 RX ORDER — SODIUM CHLORIDE, SODIUM LACTATE, POTASSIUM CHLORIDE, CALCIUM CHLORIDE 600; 310; 30; 20 MG/100ML; MG/100ML; MG/100ML; MG/100ML
INJECTION, SOLUTION INTRAVENOUS CONTINUOUS PRN
Status: DISCONTINUED | OUTPATIENT
Start: 2024-07-18 | End: 2024-07-18

## 2024-07-18 RX ORDER — ROCURONIUM BROMIDE 10 MG/ML
INJECTION, SOLUTION INTRAVENOUS AS NEEDED
Status: DISCONTINUED | OUTPATIENT
Start: 2024-07-18 | End: 2024-07-18

## 2024-07-18 RX ORDER — ACETAMINOPHEN 325 MG/1
650 TABLET ORAL EVERY 6 HOURS PRN
Status: DISCONTINUED | OUTPATIENT
Start: 2024-07-18 | End: 2024-07-18 | Stop reason: HOSPADM

## 2024-07-18 RX ORDER — MIDAZOLAM HYDROCHLORIDE 2 MG/2ML
INJECTION, SOLUTION INTRAMUSCULAR; INTRAVENOUS AS NEEDED
Status: DISCONTINUED | OUTPATIENT
Start: 2024-07-18 | End: 2024-07-18

## 2024-07-18 RX ORDER — ONDANSETRON 2 MG/ML
INJECTION INTRAMUSCULAR; INTRAVENOUS AS NEEDED
Status: DISCONTINUED | OUTPATIENT
Start: 2024-07-18 | End: 2024-07-18

## 2024-07-18 RX ORDER — DEXAMETHASONE SODIUM PHOSPHATE 10 MG/ML
INJECTION, SOLUTION INTRAMUSCULAR; INTRAVENOUS AS NEEDED
Status: DISCONTINUED | OUTPATIENT
Start: 2024-07-18 | End: 2024-07-18

## 2024-07-18 RX ORDER — FENTANYL CITRATE 50 UG/ML
INJECTION, SOLUTION INTRAMUSCULAR; INTRAVENOUS AS NEEDED
Status: DISCONTINUED | OUTPATIENT
Start: 2024-07-18 | End: 2024-07-18

## 2024-07-18 RX ORDER — MAGNESIUM HYDROXIDE 1200 MG/15ML
LIQUID ORAL AS NEEDED
Status: DISCONTINUED | OUTPATIENT
Start: 2024-07-18 | End: 2024-07-18 | Stop reason: HOSPADM

## 2024-07-18 RX ORDER — LIDOCAINE HYDROCHLORIDE 10 MG/ML
INJECTION, SOLUTION EPIDURAL; INFILTRATION; INTRACAUDAL; PERINEURAL AS NEEDED
Status: DISCONTINUED | OUTPATIENT
Start: 2024-07-18 | End: 2024-07-18

## 2024-07-18 RX ADMIN — SODIUM CHLORIDE, SODIUM LACTATE, POTASSIUM CHLORIDE, AND CALCIUM CHLORIDE: .6; .31; .03; .02 INJECTION, SOLUTION INTRAVENOUS at 08:20

## 2024-07-18 RX ADMIN — LIDOCAINE HYDROCHLORIDE 50 MG: 10 INJECTION, SOLUTION EPIDURAL; INFILTRATION; INTRACAUDAL; PERINEURAL at 09:00

## 2024-07-18 RX ADMIN — PROPOFOL 160 MG: 10 INJECTION, EMULSION INTRAVENOUS at 09:00

## 2024-07-18 RX ADMIN — SUGAMMADEX 200 MG: 100 INJECTION, SOLUTION INTRAVENOUS at 09:34

## 2024-07-18 RX ADMIN — ROCURONIUM BROMIDE 50 MG: 10 INJECTION, SOLUTION INTRAVENOUS at 09:01

## 2024-07-18 RX ADMIN — DEXAMETHASONE SODIUM PHOSPHATE 6 MG: 10 INJECTION, SOLUTION INTRAMUSCULAR; INTRAVENOUS at 09:09

## 2024-07-18 RX ADMIN — CLINDAMYCIN IN 5 PERCENT DEXTROSE 900 MG: 18 INJECTION, SOLUTION INTRAVENOUS at 09:10

## 2024-07-18 RX ADMIN — FENTANYL CITRATE 25 MCG: 50 INJECTION, SOLUTION INTRAMUSCULAR; INTRAVENOUS at 09:00

## 2024-07-18 RX ADMIN — MIDAZOLAM 2 MG: 1 INJECTION INTRAMUSCULAR; INTRAVENOUS at 08:57

## 2024-07-18 RX ADMIN — ONDANSETRON 4 MG: 2 INJECTION INTRAMUSCULAR; INTRAVENOUS at 09:31

## 2024-07-18 RX ADMIN — FENTANYL CITRATE 25 MCG: 50 INJECTION, SOLUTION INTRAMUSCULAR; INTRAVENOUS at 09:20

## 2024-07-18 NOTE — OP NOTE
OPERATIVE REPORT  PATIENT NAME: Demetria Leung    :  1950  MRN: [id number]  Pt Location: [facility] OR ROOM 03    SURGERY DATE: 2024    Surgeons and Role:     * Chilo David MD - Primary     * Almaz Benítez PA-C - Assisting    Preop Diagnosis:  Subcutaneous mass [R22.9] lower lip        Post-Op Diagnosis Codes:     * Subcutaneous mass [R22.9] lower lip    Procedure(s):  EXCISION OF SUBCUTANEOUS MASS OF RIGHT LOWER LIP 2.2 CM,  COMPLEX CLOSURE of the lower lip 2.6 cm    Specimen(s):  ID Type Source Tests Collected by Time Destination   1 : subcutaneous mass right lower lip Tissue Lip TISSUE EXAM Chilo David MD 2024 0922        Estimated Blood Loss:   Minimal    Drains:  * No LDAs found *    Anesthesia Type:   General    Operative Indications:  Subcutaneous mass [R22.9] lower lip      Operative Findings: As above        Complications:   None    Procedure and Technique:  Demetria was seen preoperatively for urinary, the surgical site was marked with this patient, and I reviewed with her the planned procedure, potential risks, complications, and limitations.  She was taken to the operating room and underwent.  The operative field was prepped and draped.  A proper timeout was medication was used throughout the procedure visualization.  Local anesthesia was the site of a mental block following this with the lip everted, the mucosa was incised with a 15 blade, dissection then proceeded down through the underlying orbicularis muscle utilizing a spreading technique with tenotomy scissors.  This revealed a subMuscular mass consistent with a mucocele.  In order to minimize the length of the incision/scar this was extracted in a piecemeal fashion, including excision/resecting the overlying mucosa.  Specimen was placed in formalin to be sent to pathology.  Hemostasis was assured with the Bovie cautery.  Given the amount of mucosal resected, closure required wide and circumferential undermining of  superficial to the orbicularis muscle.  This wide undermining was performed utilizing a fine tip Bovie cautery for distance greater than 3 times the width of the wound.  Once completed the wound was copiously irrigated and removed this assured.  Closure was then accomplished with 5-0 Vicryl sutures to repair the orbicularis muscle, this was followed by 5-0 chromic to repair the mucosa..  The wound margins appeared to be fully viable.  The patient was transferred to the recovery room under the care of the anesthesia personnel.  The physician assistant provided assistance with retraction, exposure, hemostasis and wound closure.   I was present for the entire procedure. and A qualified resident physician was not available.    Patient Disposition:  PACU           SIGNATURE: Chilo David MD  DATE: July 18, 2024  TIME: 10:05 AM

## 2024-07-18 NOTE — INTERVAL H&P NOTE
H&P reviewed. After examining the patient I find no changes in the patients condition since the H&P had been written.    Vitals:    07/18/24 0805   BP: 136/72   Pulse: 67   Resp: 20   Temp: 98.5 °F (36.9 °C)   SpO2: 94%

## 2024-07-18 NOTE — INTERIM OP NOTE
EXCISION OF SUBCUTANEOUS MASS OF RIGHT LOWER LIP WITH COMPLEX CLOSURE  Postoperative Note  PATIENT NAME: Demetria Leung  : 1950  MRN: [id number]  [facility] OR ROOM 03    Surgery Date: 2024    Preop Diagnosis:  Subcutaneous mass [R22.9]    Post-Op Diagnosis Codes:     * Subcutaneous mass [R22.9]    Procedure(s) (LRB):  EXCISION OF SUBCUTANEOUS MASS OF RIGHT LOWER LIP WITH COMPLEX CLOSURE (N/A)    Surgeons and Role:     * Chilo David MD - Primary     * Almaz Benítez PA-C - Assisting    Specimens:  ID Type Source Tests Collected by Time Destination   1 : subcutaneous mass right lower lip Tissue Lip TISSUE EXAM Chilo David MD 2024 0922        Estimated Blood Loss:   Minimal    Anesthesia Type:   General     Findings:    Right lower lip subq mass  Complications:   None      SIGNATURE: Almaz Benítez PA-C   DATE: 2024   TIME: 9:36 AM

## 2024-07-18 NOTE — DISCHARGE INSTR - AVS FIRST PAGE
.[facility]  Dr. MEENU David Jr.  [address] [place] [address]  Phone: [phone]     Postoperative Instructions for Outpatient Surgery     These instructions are being provided by your doctor to give you basic guidelines during your post-op recovery. Please let our office know if your contact information has changed.      Please call the office today for an appointment in 2 weeks for postoperative care     Dressings: None     Activity Restrictions: Nothing strenuous for 48 hours.      Bathing:  may bathe tomorrow     Medications:    Resume pre-op medications.   You may take tylenol, aleve, or ibuprofen for pain control                 Other: Elevate head of bed at night to sleep over the next 48 hours. Apply ice to right lip at 10 min intervals as needed for pain or swelling.

## 2024-07-18 NOTE — ANESTHESIA POSTPROCEDURE EVALUATION
Post-Op Assessment Note    CV Status:  Stable  Pain Score: 0    Pain management: adequate       Mental Status:  Alert and awake   Hydration Status:  Euvolemic   PONV Controlled:  Controlled   Airway Patency:  Patent  Airway: intubated     Post Op Vitals Reviewed: Yes    No anethesia notable event occurred.    Staff: Anesthesiologist, CRNA               BP      Temp      Pulse     Resp      SpO2

## 2024-07-22 PROCEDURE — 88305 TISSUE EXAM BY PATHOLOGIST: CPT | Performed by: STUDENT IN AN ORGANIZED HEALTH CARE EDUCATION/TRAINING PROGRAM

## 2024-07-28 DIAGNOSIS — E03.8 HYPOTHYROIDISM DUE TO HASHIMOTO'S THYROIDITIS: ICD-10-CM

## 2024-07-28 DIAGNOSIS — E06.3 HYPOTHYROIDISM DUE TO HASHIMOTO'S THYROIDITIS: ICD-10-CM

## 2024-07-28 RX ORDER — LEVOTHYROXINE SODIUM 88 UG/1
88 TABLET ORAL DAILY
Qty: 30 TABLET | Refills: 5 | Status: SHIPPED | OUTPATIENT
Start: 2024-07-28

## 2024-08-01 ENCOUNTER — OFFICE VISIT (OUTPATIENT)
Dept: PLASTIC SURGERY | Facility: CLINIC | Age: 74
End: 2024-08-01

## 2024-08-01 DIAGNOSIS — K13.0 MUCOCELE OF LOWER LIP: Primary | ICD-10-CM

## 2024-08-01 PROCEDURE — 99024 POSTOP FOLLOW-UP VISIT: CPT | Performed by: PHYSICIAN ASSISTANT

## 2024-08-01 NOTE — PROGRESS NOTES
Assessment/Plan:     Patient is a 74-year-old female who is status post excision of a subcutaneous mass of the right lower lip by Dr. David on 7/18/2024.  Please see HPI.    Patient returns to the office today for first postoperative visit.  Intraoperative pathology was positive for mucocele.  All sutures have dissolved and lip is fully healed.  Patient will return to our office as needed with any questions or concerns per patient preference.     Diagnoses and all orders for this visit:    Mucocele of lower lip          Subjective:     Patient ID: Demetria Leung is a 74 y.o. female.    HPI    Patient reports no issues or concerns.  She has been doing well postoperatively.    Review of Systems    See HPI    Objective:     Physical Exam      Lower lip incision is fully healed.  No remaining sutures noted.

## 2024-08-12 ENCOUNTER — APPOINTMENT (OUTPATIENT)
Dept: LAB | Facility: CLINIC | Age: 74
End: 2024-08-12
Payer: MEDICARE

## 2024-08-12 ENCOUNTER — HOSPITAL ENCOUNTER (OUTPATIENT)
Dept: MAMMOGRAPHY | Facility: CLINIC | Age: 74
Discharge: HOME | End: 2024-08-12
Payer: MEDICARE

## 2024-08-12 VITALS — HEIGHT: 65 IN | WEIGHT: 233 LBS | BODY MASS INDEX: 38.82 KG/M2

## 2024-08-12 DIAGNOSIS — Z12.31 ENCOUNTER FOR SCREENING MAMMOGRAM FOR BREAST CANCER: ICD-10-CM

## 2024-08-12 DIAGNOSIS — E03.8 HYPOTHYROIDISM DUE TO HASHIMOTO'S THYROIDITIS: ICD-10-CM

## 2024-08-12 DIAGNOSIS — E06.3 HYPOTHYROIDISM DUE TO HASHIMOTO'S THYROIDITIS: ICD-10-CM

## 2024-08-12 LAB
T4 FREE SERPL-MCNC: 1.62 NG/DL (ref 0.61–1.12)
TSH SERPL DL<=0.05 MIU/L-ACNC: 1.1 UIU/ML (ref 0.45–4.5)

## 2024-08-12 PROCEDURE — 36415 COLL VENOUS BLD VENIPUNCTURE: CPT

## 2024-08-12 PROCEDURE — 84443 ASSAY THYROID STIM HORMONE: CPT

## 2024-08-12 PROCEDURE — 77063 BREAST TOMOSYNTHESIS BI: CPT

## 2024-08-12 PROCEDURE — 77067 SCR MAMMO BI INCL CAD: CPT

## 2024-08-12 PROCEDURE — 84439 ASSAY OF FREE THYROXINE: CPT

## 2024-08-27 ENCOUNTER — OFFICE VISIT (OUTPATIENT)
Dept: ENDOCRINOLOGY | Facility: HOSPITAL | Age: 74
End: 2024-08-27
Payer: MEDICARE

## 2024-08-27 VITALS
BODY MASS INDEX: 31.42 KG/M2 | DIASTOLIC BLOOD PRESSURE: 80 MMHG | WEIGHT: 188.6 LBS | SYSTOLIC BLOOD PRESSURE: 122 MMHG | HEART RATE: 68 BPM | HEIGHT: 65 IN

## 2024-08-27 DIAGNOSIS — E06.3 HYPOTHYROIDISM DUE TO HASHIMOTO'S THYROIDITIS: Primary | ICD-10-CM

## 2024-08-27 DIAGNOSIS — N18.30 STAGE 3 CHRONIC KIDNEY DISEASE, UNSPECIFIED WHETHER STAGE 3A OR 3B CKD (HCC): ICD-10-CM

## 2024-08-27 DIAGNOSIS — E03.8 HYPOTHYROIDISM DUE TO HASHIMOTO'S THYROIDITIS: Primary | ICD-10-CM

## 2024-08-27 PROCEDURE — 99213 OFFICE O/P EST LOW 20 MIN: CPT | Performed by: INTERNAL MEDICINE

## 2024-08-27 NOTE — PROGRESS NOTES
8/28/2024    Assessment & Plan      Diagnoses and all orders for this visit:    Hypothyroidism due to Hashimoto's thyroiditis  -     T4, free; Future  -     TSH, 3rd generation; Future    Stage 3 chronic kidney disease, unspecified whether stage 3a or 3b CKD (HCC)        Assessment & Plan  1. Hypothyroidism due to Hashimoto's thyroiditis.  Most recent thyroid function studies are normal. Her free T4 is mildly elevated because she took her thyroid hormone earlier in the day, but her TSH is exactly where it needs to be at just over 1. She is biochemically euthyroid. She was reassured that her fatigue is not related to her thyroid disease. She will continue the same levothyroxine 88 mcg daily.     Follow up in 1 year with preceding TSH and free T4.        CC: hypothyroid follow up    History of Present Illness    HPI: Demetria Leung  is a 74-year-old female with a history of hypothyroidism due to Hashimoto's thyroiditis, here for a follow-up visit.    She was diagnosed with hypothyroidism over 26 years ago.  She is currently on a daily dose of levothyroxine 88 mcg.     Despite this, she reports persistent fatigue. Her sleep is disturbed by vivid dreams, as observed by her  who notes her frequent movements during sleep. She has even experienced episodes of screaming in her sleep.    She reports feeling stressed due to her 's health issues. She admits to not maintaining a balanced diet and describes herself as lazy. She does not experience diarrhea or constipation. She has not had any episodes of heart palpitations since starting metoprolol. She does not have tremors or shaky hands but reports chronic dry skin. She does not have brittle nails but did sustain injuries to her fingers during a fall in April 2024. She has always experienced hair loss. She does not feel depressed but admits to occasional anxiety. She has no difficulty swallowing and does not experience food getting stuck in her throat.    She  had knee replacement surgery and was prescribed Lexapro in January 2024, which she discontinued in March 2024.        Historical Information   Past Medical History:   Diagnosis Date    Asthma     Atrial fibrillation (HCC)     Breast cyst     Disease of thyroid gland     Endometriosis     Fibroid     Gout     Hyperlipidemia     Hypertension     Osteoarthritis     PONV (postoperative nausea and vomiting)     Silent myocardial infarction (HCC)     Vertigo     at times     Past Surgical History:   Procedure Laterality Date    BREAST CYST ASPIRATION Bilateral     CARDIAC ELECTROPHYSIOLOGY STUDY AND ABLATION      CATARACT EXTRACTION Bilateral 2022    CYST REMOVAL Bilateral     aspiration    HYSTERECTOMY      age 40    OOPHORECTOMY Bilateral     age 40    CA EXC TUMOR SOFT TISS FACE&SCALP SUBFASCIAL <2CM N/A 7/18/2024    Procedure: EXCISION OF SUBCUTANEOUS MASS OF RIGHT LOWER LIP WITH COMPLEX CLOSURE;  Surgeon: Chilo David MD;  Location:  MAIN OR;  Service: Plastics    REPLACEMENT TOTAL KNEE BILATERAL Bilateral     TONSILLECTOMY AND ADENOIDECTOMY       Social History   Social History     Substance and Sexual Activity   Alcohol Use No     Social History     Substance and Sexual Activity   Drug Use No     Social History     Tobacco Use   Smoking Status Never   Smokeless Tobacco Never     Family History:   Family History   Problem Relation Age of Onset    Alzheimer's disease Mother     Hypertension Father     Heart attack Father     Liver disease Brother         on O2    Heart disease Brother     No Known Problems Daughter     Obesity Son         post gastric bypass       Meds/Allergies   Current Outpatient Medications   Medication Sig Dispense Refill    albuterol (PROVENTIL HFA,VENTOLIN HFA) 90 mcg/act inhaler 2 puffs as needed      indomethacin (INDOCIN) 25 mg capsule Take 25 mg by mouth as needed for mild pain      levothyroxine 88 mcg tablet TAKE 1 TABLET BY MOUTH DAILY 30 tablet 5    LORazepam (ATIVAN) 0.5 mg  "tablet Take 0.5 mg by mouth as needed      meclizine (ANTIVERT) 12.5 MG tablet 1 tablet as needed      metoprolol succinate (TOPROL-XL) 25 mg 24 hr tablet every 24 hours      ondansetron (ZOFRAN) 8 mg tablet Take by mouth every 8 (eight) hours as needed for nausea or vomiting      pantoprazole (PROTONIX) 40 mg tablet Take 40 mg by mouth daily  3    simvastatin (ZOCOR) 20 mg tablet Take 20 mg by mouth daily at bedtime         No current facility-administered medications for this visit.     Allergies   Allergen Reactions    Penicillin G      Other reaction(s): anaphylaxis    Erythromycin      Other reaction(s): n/v       Objective   Vitals: Blood pressure 122/80, pulse 68, height 5' 4.5\" (1.638 m), weight 85.5 kg (188 lb 9.6 oz).  Invasive Devices       None                   Physical Exam    No lid lag, stare, proptosis or periorbital edema in the eyes.  Thyroid is normal in size. No palpable thyroid nodules in the neck.  Lungs are clear to auscultation.  Heart has a regular rate and rhythm. No murmurs.  No tremor of the outstretched hands. Patellar deep tendon reflexes are normal.      The history was obtained from the review of the chart and from the patient.    Lab Results:      Blood work performed on 8/12/2024 showed a TSH of 1.098 with a free T4 of 1.62.    Lab Results   Component Value Date    CREATININE 0.95 07/08/2024    CREATININE 0.94 04/18/2024    CREATININE 1.04 05/02/2023    BUN 20 07/08/2024     05/06/2015    K 4.4 07/08/2024     07/08/2024    CO2 26 07/08/2024     eGFR   Date Value Ref Range Status   07/08/2024 58 ml/min/1.73sq m Final         Lab Results   Component Value Date    CHOL 146 11/20/2015    HDL 58 04/18/2024    TRIG 70 04/18/2024       Lab Results   Component Value Date    ALT 11 04/18/2024    AST 20 04/18/2024    ALKPHOS 66 04/18/2024    BILITOT 0.65 05/06/2015       Lab Results   Component Value Date    FREET4 1.62 (H) 08/12/2024             Future Appointments   Date Time " Provider Department Center   12/6/2024  8:00 AM Serjio Turner MD [facility]   8/27/2025  8:00 AM Chelsy Hay MD [facility]

## 2024-08-27 NOTE — PATIENT INSTRUCTIONS
The thyroid blood work is normal.     Continue the same levothyroxine 88 mcg daily.     Follow up in 1 year with blood work.

## 2024-12-06 ENCOUNTER — OFFICE VISIT (OUTPATIENT)
Dept: CARDIOLOGY CLINIC | Facility: CLINIC | Age: 74
End: 2024-12-06
Payer: MEDICARE

## 2024-12-06 VITALS
SYSTOLIC BLOOD PRESSURE: 110 MMHG | BODY MASS INDEX: 30.49 KG/M2 | HEART RATE: 63 BPM | DIASTOLIC BLOOD PRESSURE: 62 MMHG | HEIGHT: 65 IN | WEIGHT: 183 LBS

## 2024-12-06 DIAGNOSIS — I47.10 PSVT (PAROXYSMAL SUPRAVENTRICULAR TACHYCARDIA) (HCC): Primary | ICD-10-CM

## 2024-12-06 PROCEDURE — 99214 OFFICE O/P EST MOD 30 MIN: CPT | Performed by: INTERNAL MEDICINE

## 2024-12-06 PROCEDURE — 93000 ELECTROCARDIOGRAM COMPLETE: CPT | Performed by: INTERNAL MEDICINE

## 2024-12-06 RX ORDER — NYSTATIN 100000 [USP'U]/G
POWDER TOPICAL
COMMUNITY
Start: 2024-11-05

## 2024-12-06 NOTE — PROGRESS NOTES
Cardiology Follow Up    Demetria Leung  1950  [id number]  [facility] [place] [address]  [facility]  [facility] [address]  [phone]    1. PSVT (paroxysmal supraventricular tachycardia) (HCC)  POCT ECG          Interval History: Followup for PSVT.    Overall doing well. No chest pain, dyspnea or palpitations. No complaints.     Medical Problems       Problem List       Hypothyroidism due to Hashimoto's thyroiditis    Asthma    Atrial fibrillation (HCC)    Hyperlipidemia    Hypertension    PONV (postoperative nausea and vomiting)    Silent myocardial infarction (HCC)    Mucocele of lower lip    Stage 3 chronic kidney disease, unspecified whether stage 3a or 3b CKD (HCC)    Lab Results   Component Value Date    EGFR 58 07/08/2024    EGFR 59 04/18/2024    EGFR 52 05/02/2023    CREATININE 0.95 07/08/2024    CREATININE 0.94 04/18/2024    CREATININE 1.04 05/02/2023             Past Medical History:   Diagnosis Date    Asthma     Atrial fibrillation (HCC)     Breast cyst     Disease of thyroid gland     Endometriosis     Fibroid     Gout     Hyperlipidemia     Hypertension     Osteoarthritis     PONV (postoperative nausea and vomiting)     Silent myocardial infarction (HCC)     Vertigo     at times     Social History     Socioeconomic History    Marital status: /Civil Union     Spouse name: Not on file    Number of children: Not on file    Years of education: Not on file    Highest education level: Not on file   Occupational History    Occupation: watching [employer]   Tobacco Use    Smoking status: Never    Smokeless tobacco: Never   Vaping Use    Vaping status: Never Used   Substance and Sexual Activity    Alcohol use: No    Drug use: No    Sexual activity: Not Currently     Partners: Male   Other Topics Concern    Not on file   Social History Narrative    Not on file     Social Drivers of Health     Financial Resource Strain:  Not on file   Food Insecurity: Not on file   Transportation Needs: Not on file   Physical Activity: Not on file   Stress: Not on file   Social Connections: Not on file   Intimate Partner Violence: Not on file   Housing Stability: Not on file      Family History   Problem Relation Age of Onset    Alzheimer's disease Mother     Hypertension Father     Heart attack Father     Liver disease Brother         on O2    Heart disease Brother     No Known Problems Daughter     Obesity Son         post gastric bypass     Past Surgical History:   Procedure Laterality Date    BREAST CYST ASPIRATION Bilateral     CARDIAC ELECTROPHYSIOLOGY STUDY AND ABLATION      CATARACT EXTRACTION Bilateral 2022    CYST REMOVAL Bilateral     aspiration    HYSTERECTOMY      age 40    OOPHORECTOMY Bilateral     age 40    PA EXC TUMOR SOFT TISS FACE&SCALP SUBFASCIAL <2CM N/A 7/18/2024    Procedure: EXCISION OF SUBCUTANEOUS MASS OF RIGHT LOWER LIP WITH COMPLEX CLOSURE;  Surgeon: Chilo David MD;  Location:  MAIN OR;  Service: Plastics    REPLACEMENT TOTAL KNEE BILATERAL Bilateral     TONSILLECTOMY AND ADENOIDECTOMY         Current Outpatient Medications:     albuterol (PROVENTIL HFA,VENTOLIN HFA) 90 mcg/act inhaler, 2 puffs as needed, Disp: , Rfl:     levothyroxine 88 mcg tablet, TAKE 1 TABLET BY MOUTH DAILY, Disp: 30 tablet, Rfl: 5    LORazepam (ATIVAN) 0.5 mg tablet, Take 0.5 mg by mouth as needed, Disp: , Rfl:     metoprolol succinate (TOPROL-XL) 25 mg 24 hr tablet, every 24 hours, Disp: , Rfl:     nystatin (MYCOSTATIN) powder, APPLY TO THE AFFECTED AREA(S) 3 TIMES DAILY AS NEEDED, Disp: , Rfl:     ondansetron (ZOFRAN) 8 mg tablet, Take by mouth every 8 (eight) hours as needed for nausea or vomiting, Disp: , Rfl:     pantoprazole (PROTONIX) 40 mg tablet, Take 40 mg by mouth daily, Disp: , Rfl: 3    simvastatin (ZOCOR) 20 mg tablet, Take 20 mg by mouth daily at bedtime  , Disp: , Rfl:   Allergies   Allergen Reactions    Penicillin G       "Other reaction(s): anaphylaxis    Erythromycin      Other reaction(s): n/v       Labs:     Chemistry        Component Value Date/Time     05/06/2015 0804    K 4.4 07/08/2024 0811    K 4.6 05/06/2015 0804     07/08/2024 0811     05/06/2015 0804    CO2 26 07/08/2024 0811    CO2 29 05/06/2015 0804    BUN 20 07/08/2024 0811    BUN 20 05/06/2015 0804    CREATININE 0.95 07/08/2024 0811    CREATININE 0.94 05/06/2015 0804        Component Value Date/Time    CALCIUM 8.9 07/08/2024 0811    CALCIUM 8.7 05/06/2015 0804    ALKPHOS 66 04/18/2024 0742    ALKPHOS 80 05/06/2015 0804    AST 20 04/18/2024 0742    AST 21 05/06/2015 0804    ALT 11 04/18/2024 0742    ALT 25 05/06/2015 0804    BILITOT 0.65 05/06/2015 0804            Lab Results   Component Value Date    CHOL 146 11/20/2015    CHOL 169 05/06/2015    CHOL 174 10/03/2014     Lab Results   Component Value Date    HDL 58 04/18/2024    HDL 73 05/02/2023    HDL 58 10/14/2022     Lab Results   Component Value Date    LDLCALC 95 04/18/2024    LDLCALC 97 05/02/2023    LDLCALC 94 10/14/2022     Lab Results   Component Value Date    TRIG 70 04/18/2024    TRIG 68 05/02/2023    TRIG 61 10/14/2022     No results found for: \"CHOLHDL\"    Imaging: No results found.    EKG: Normal Sinus Rhythm Nonspecific T wave abnormality. .    Review of Systems   Constitutional: Negative.   HENT: Negative.     Eyes: Negative.    Cardiovascular: Negative.    Respiratory: Negative.     Endocrine: Negative.    Hematologic/Lymphatic: Negative.    Skin: Negative.    Musculoskeletal: Negative.    Gastrointestinal: Negative.    Genitourinary: Negative.    Neurological: Negative.    Psychiatric/Behavioral: Negative.     Allergic/Immunologic: Negative.        Vitals:    12/06/24 0804   BP: 110/62   Pulse: 63           Physical Exam  Vitals and nursing note reviewed.   Constitutional:       Appearance: Normal appearance.   HENT:      Head: Normocephalic.      Nose: Nose normal.      Mouth/Throat: "      Mouth: Mucous membranes are moist.   Eyes:      General: No scleral icterus.     Conjunctiva/sclera: Conjunctivae normal.   Cardiovascular:      Rate and Rhythm: Normal rate and regular rhythm.      Heart sounds: No murmur heard.     No gallop.   Pulmonary:      Effort: Pulmonary effort is normal. No respiratory distress.      Breath sounds: Normal breath sounds. No wheezing or rales.   Abdominal:      General: Abdomen is flat. Bowel sounds are normal. There is no distension.      Palpations: Abdomen is soft.      Tenderness: There is no abdominal tenderness. There is no guarding.   Musculoskeletal:      Cervical back: Normal range of motion and neck supple.      Right lower leg: No edema.      Left lower leg: No edema.   Skin:     General: Skin is warm and dry.   Neurological:      General: No focal deficit present.      Mental Status: She is alert and oriented to person, place, and time.   Psychiatric:         Mood and Affect: Mood normal.         Behavior: Behavior normal.         Discussion/Summary:    Dyslipidemia: Continue with simvastatin. LDL is 95. No changes.      PSVT:  Hx of SVT ablation in 1998. She is doing well on metoprolol and asymptomatic.     I have spent a total time of 25 minutes in caring for this patient on the day of the visit/encounter including Diagnostic results, Prognosis, Risks and benefits of tx options, Instructions for management, and Patient and family education.

## 2024-12-06 NOTE — PROGRESS NOTES
Cardiology Follow Up    Demetria Leung  1950  [id number]  [facility] [place] [address] [address]  [facility] [place]  [facility] [id number] [phone]    No diagnosis found.    Interval History:   MARITZA is a 74-y.o. female w/ a PMH atrial fibrillation, HTN, PSVT presenting to the OP cardiology office for routine annual follow-up. Patient complains of shortness of breath that she attributes to asthma, and the SoB is relieved by albuterol. She has an appointment with pulmonology in January. She denies chest pain, palpitations, orthopnea, PND, leg swelling since last being seen. She states she is compliant with her medications. She states she eats a balanced meal and walks regularly for exercise.    Patient Active Problem List   Diagnosis    Hypothyroidism due to Hashimoto's thyroiditis    Asthma    Atrial fibrillation (HCC)    Hyperlipidemia    Hypertension    PONV (postoperative nausea and vomiting)    Silent myocardial infarction (HCC)    Mucocele of lower lip    Stage 3 chronic kidney disease, unspecified whether stage 3a or 3b CKD (HCC)     Past Medical History:   Diagnosis Date    Asthma     Atrial fibrillation (HCC)     Breast cyst     Disease of thyroid gland     Endometriosis     Fibroid     Gout     Hyperlipidemia     Hypertension     Osteoarthritis     PONV (postoperative nausea and vomiting)     Silent myocardial infarction (HCC)     Vertigo     at times     Social History     Socioeconomic History    Marital status: /Civil Union     Spouse name: Not on file    Number of children: Not on file    Years of education: Not on file    Highest education level: Not on file   Occupational History    Occupation: watching [employer]   Tobacco Use    Smoking status: Never    Smokeless tobacco: Never   Vaping Use    Vaping status: Never Used   Substance and Sexual Activity    Alcohol use: No    Drug use: No    Sexual activity: Not Currently      Partners: Male   Other Topics Concern    Not on file   Social History Narrative    Not on file     Social Drivers of Health     Financial Resource Strain: Not on file   Food Insecurity: Not on file   Transportation Needs: Not on file   Physical Activity: Not on file   Stress: Not on file   Social Connections: Not on file   Intimate Partner Violence: Not on file   Housing Stability: Not on file      Family History   Problem Relation Age of Onset    Alzheimer's disease Mother     Hypertension Father     Heart attack Father     Liver disease Brother         on O2    Heart disease Brother     No Known Problems Daughter     Obesity Son         post gastric bypass     Past Surgical History:   Procedure Laterality Date    BREAST CYST ASPIRATION Bilateral     CARDIAC ELECTROPHYSIOLOGY STUDY AND ABLATION      CATARACT EXTRACTION Bilateral 2022    CYST REMOVAL Bilateral     aspiration    HYSTERECTOMY      age 40    OOPHORECTOMY Bilateral     age 40    NH EXC TUMOR SOFT TISS FACE&SCALP SUBFASCIAL <2CM N/A 7/18/2024    Procedure: EXCISION OF SUBCUTANEOUS MASS OF RIGHT LOWER LIP WITH COMPLEX CLOSURE;  Surgeon: Chilo David MD;  Location:  MAIN OR;  Service: Plastics    REPLACEMENT TOTAL KNEE BILATERAL Bilateral     TONSILLECTOMY AND ADENOIDECTOMY         Current Outpatient Medications:     albuterol (PROVENTIL HFA,VENTOLIN HFA) 90 mcg/act inhaler, 2 puffs as needed, Disp: , Rfl:     indomethacin (INDOCIN) 25 mg capsule, Take 25 mg by mouth as needed for mild pain, Disp: , Rfl:     levothyroxine 88 mcg tablet, TAKE 1 TABLET BY MOUTH DAILY, Disp: 30 tablet, Rfl: 5    LORazepam (ATIVAN) 0.5 mg tablet, Take 0.5 mg by mouth as needed, Disp: , Rfl:     meclizine (ANTIVERT) 12.5 MG tablet, 1 tablet as needed, Disp: , Rfl:     metoprolol succinate (TOPROL-XL) 25 mg 24 hr tablet, every 24 hours, Disp: , Rfl:     ondansetron (ZOFRAN) 8 mg tablet, Take by mouth every 8 (eight) hours as needed for nausea or vomiting, Disp: ,  "Rfl:     pantoprazole (PROTONIX) 40 mg tablet, Take 40 mg by mouth daily, Disp: , Rfl: 3    simvastatin (ZOCOR) 20 mg tablet, Take 20 mg by mouth daily at bedtime  , Disp: , Rfl:   Allergies   Allergen Reactions    Penicillin G      Other reaction(s): anaphylaxis    Erythromycin      Other reaction(s): n/v       Labs:  Appointment on 08/12/2024   Component Date Value    TSH 3RD GENERATON 08/12/2024 1.098     Free T4 08/12/2024 1.62 (H)    Admission on 07/18/2024, Discharged on 07/18/2024   Component Date Value    Case Report 07/18/2024                      Value:Surgical Pathology Report                         Case: [id number]                                  Authorizing Provider:  Chilo David MD      Collected:           07/18/2024 0922              Ordering Location:     [facility]     Received:            07/18/2024 1103                                     [facility]                                                        Pathologist:           Gema Hollins MD                                                         Specimen:    Lip, subcutaneous mass right lower lip                                                     Final Diagnosis 07/18/2024                      Value:A. Squamous mucosa and minor salivary glands, \"subcutaneous mass, right lower lip\"; excision:       - Consistent with mucocele       - Negative for malignancy      Additional Information 07/18/2024                      Value:Interpretation performed at [facility], [address] [facility] [place] [address]    All reported additional testing was performed with appropriately reactive controls.  These tests were developed and their performance characteristics determined by [facility] or appropriate performing facility, though some tests may be performed on tissues which have not been validated for performance characteristics (such as staining performed on alcohol exposed cell blocks and decalcified " "tissues).  Results should be interpreted with caution and in the context of the patients’ clinical condition. These tests may not be cleared or approved by the U.S. Food and Drug Administration, though the FDA has determined that such clearance or approval is not necessary. These tests are used for clinical purposes and they should not be regarded as investigational or for research. This laboratory has been approved by [place] 88, designated as a high-complexity laboratory and is qualified to perform                           these tests.  .      Gross Description 07/18/2024                      Value:A. The specimen is received in formalin, labeled with the patient's name and hospital number, and is designated \" subcutaneous mass right lower lip\".  The specimen consists of multiple tan-red, membranous, mucinous tissue fragments measuring in aggregate of 1.7 x 1.1 x 0.2 cm.  Entirely submitted. Two cassettes.  In an embedding bag.    Note: The estimated total formalin fixation time based upon information provided by the submitting clinician and the standard processing schedule is under 72 hours.      [place]      Clinical Information 07/18/2024                      Value:MINI Augustin is a very pleasant 76-year-old female self-referred, who is being seen today in regard to a slowly enlarging mass of the right lower lip.  She points out (intraorally) the area of prominence of the lower lip where there is a visible, palpable subcutaneous mass.   Appointment on 07/08/2024   Component Date Value    Sodium 07/08/2024 135     Potassium 07/08/2024 4.4     Chloride 07/08/2024 102     CO2 07/08/2024 26     ANION GAP 07/08/2024 7     BUN 07/08/2024 20     Creatinine 07/08/2024 0.95     Glucose, Fasting 07/08/2024 79     Calcium 07/08/2024 8.9     eGFR 07/08/2024 58     WBC 07/08/2024 6.16     RBC 07/08/2024 3.78 (L)     Hemoglobin 07/08/2024 11.9     Hematocrit 07/08/2024 37.8     MCV 07/08/2024 100 (H)     MCH 07/08/2024 31.5     " MCHC 07/08/2024 31.5     RDW 07/08/2024 12.8     MPV 07/08/2024 10.8     Platelets 07/08/2024 198     nRBC 07/08/2024 0     Segmented % 07/08/2024 54     Immature Grans % 07/08/2024 1     Lymphocytes % 07/08/2024 27     Monocytes % 07/08/2024 10     Eosinophils Relative 07/08/2024 6     Basophils Relative 07/08/2024 2 (H)     Absolute Neutrophils 07/08/2024 3.43     Absolute Immature Grans 07/08/2024 0.03     Absolute Lymphocytes 07/08/2024 1.66     Absolute Monocytes 07/08/2024 0.59     Eosinophils Absolute 07/08/2024 0.36     Basophils Absolute 07/08/2024 0.09      Imaging: No results found.    Review of Systems:  Review of Systems   Respiratory:  Negative for cough, chest tightness and shortness of breath.    Cardiovascular:  Negative for chest pain, palpitations and leg swelling.   Gastrointestinal:  Negative for abdominal pain and vomiting.   Musculoskeletal:  Negative for arthralgias and back pain.   Skin:  Negative for color change and rash.   Neurological:  Negative for dizziness, seizures, syncope, weakness, light-headedness and headaches.   All other systems reviewed and are negative.      Physical Exam:  Physical Exam  Constitutional:       Appearance: Normal appearance.   HENT:      Head: Normocephalic and atraumatic.      Nose: Nose normal.   Eyes:      Conjunctiva/sclera: Conjunctivae normal.   Cardiovascular:      Rate and Rhythm: Normal rate and regular rhythm.      Pulses: Normal pulses.      Heart sounds: Normal heart sounds. No murmur heard.     No friction rub. No gallop.   Pulmonary:      Effort: Pulmonary effort is normal.      Breath sounds: Normal breath sounds.   Musculoskeletal:      Cervical back: Normal range of motion and neck supple.      Right lower leg: No edema.      Left lower leg: No edema.   Skin:     General: Skin is warm and dry.      Capillary Refill: Capillary refill takes less than 2 seconds.   Neurological:      General: No focal deficit present.      Mental Status: She is  alert and oriented to person, place, and time.   Psychiatric:         Mood and Affect: Mood normal.         Behavior: Behavior normal.         Discussion/Summary:  Hyperlipidemia: continue therapy with simvastatin 20mg. LDL on 4/18 was 95.  Atrial fibrillation: denies any episodes. Continue w/ metoprolol 25mg.  PSVT: denies any recurrences. Continue w/ metoprolol 25mg.    I have spent a total of 25 minutes with the patient. Discussed prognosis, importance of medication compliance, lab results, lifestyle modifications and exercise, and EKG results.     Lit DIEHL

## 2025-01-24 DIAGNOSIS — E06.3 HYPOTHYROIDISM DUE TO HASHIMOTO'S THYROIDITIS: ICD-10-CM

## 2025-01-24 RX ORDER — LEVOTHYROXINE SODIUM 88 UG/1
88 TABLET ORAL DAILY
Qty: 30 TABLET | Refills: 5 | Status: SHIPPED | OUTPATIENT
Start: 2025-01-24

## 2025-01-28 ENCOUNTER — HOSPITAL ENCOUNTER (OUTPATIENT)
Dept: ULTRASOUND IMAGING | Facility: CLINIC | Age: 75
Discharge: HOME | End: 2025-01-28
Payer: MEDICARE

## 2025-01-28 DIAGNOSIS — R11.2 NAUSEA AND VOMITING, UNSPECIFIED VOMITING TYPE: ICD-10-CM

## 2025-01-28 PROCEDURE — 76700 US EXAM ABDOM COMPLETE: CPT

## 2025-03-28 ENCOUNTER — CONSULT (OUTPATIENT)
Age: 75
End: 2025-03-28
Payer: MEDICARE

## 2025-03-28 VITALS
HEART RATE: 71 BPM | WEIGHT: 188 LBS | HEIGHT: 65 IN | SYSTOLIC BLOOD PRESSURE: 122 MMHG | OXYGEN SATURATION: 97 % | BODY MASS INDEX: 31.32 KG/M2 | TEMPERATURE: 97.4 F | DIASTOLIC BLOOD PRESSURE: 78 MMHG

## 2025-03-28 DIAGNOSIS — J45.40 MODERATE PERSISTENT ASTHMA WITHOUT COMPLICATION: ICD-10-CM

## 2025-03-28 DIAGNOSIS — J45.20 INTERMITTENT ASTHMA, UNSPECIFIED ASTHMA SEVERITY, UNSPECIFIED WHETHER COMPLICATED: Primary | ICD-10-CM

## 2025-03-28 PROCEDURE — 95012 NITRIC OXIDE EXP GAS DETER: CPT | Performed by: INTERNAL MEDICINE

## 2025-03-28 PROCEDURE — 99204 OFFICE O/P NEW MOD 45 MIN: CPT | Performed by: INTERNAL MEDICINE

## 2025-03-28 PROCEDURE — G2211 COMPLEX E/M VISIT ADD ON: HCPCS | Performed by: INTERNAL MEDICINE

## 2025-03-28 PROCEDURE — 94010 BREATHING CAPACITY TEST: CPT | Performed by: INTERNAL MEDICINE

## 2025-03-28 RX ORDER — FLUTICASONE PROPIONATE AND SALMETEROL 250; 50 UG/1; UG/1
1 POWDER RESPIRATORY (INHALATION) 2 TIMES DAILY
Qty: 60 BLISTER | Refills: 5 | Status: SHIPPED | OUTPATIENT
Start: 2025-03-28

## 2025-03-28 RX ORDER — CICLOPIROX OLAMINE 7.7 MG/G
CREAM TOPICAL 2 TIMES DAILY
COMMUNITY

## 2025-03-28 NOTE — ASSESSMENT & PLAN NOTE
Orders:    POCT spirometry    POCT FeNO    XR chest pa and lateral; Future    Echo complete w/ contrast if indicated; Future    B-Type Natriuretic Peptide(BNP); Future    CBC and differential; Future    Fluticasone-Salmeterol (Wixela Inhub) 250-50 mcg/dose inhaler; Inhale 1 puff 2 (two) times a day Rinse mouth after use.    Complete PFT without post bronchodilator; Future

## 2025-03-28 NOTE — PROGRESS NOTES
Name: Demetria Leung      : 1950      MRN: [id number]  Encounter Provider: Maryellen Fay DO  Encounter Date: 3/28/2025   Encounter department: [facility]  :  Assessment & Plan  Intermittent asthma, unspecified asthma severity, unspecified whether complicated    Orders:    POCT spirometry    POCT FeNO    XR chest pa and lateral; Future    Echo complete w/ contrast if indicated; Future    B-Type Natriuretic Peptide(BNP); Future    CBC and differential; Future    Fluticasone-Salmeterol (Wixela Inhub) 250-50 mcg/dose inhaler; Inhale 1 puff 2 (two) times a day Rinse mouth after use.    Complete PFT without post bronchodilator; Future    Moderate persistent asthma without complication  Yeimi have an FeNO today of 33 indicating some of her chest tightness and shortness of breath is due to uncontrolled asthma.  I started her on Wixela 1 puff twice a day and instructed her on proper inhaler use.  She can continue to use her albuterol as needed.  Her spirometry today was not normal either she had some mild obstructive disease but restriction suggested by office spirometry.  I like to get a chest x-ray and a BNP and an echocardiogram.  In addition we will get full pulmonary function test to better elicit her lung function.  She will return after this testing and being on Wixela for 1 month follow-up.             History of Present Illness   HPI  Demetria Leung is a 75 y.o. female who presents for evaluation of shortness of breath.  She was diagnosed with asthma as an adult and has been on just as needed albuterol.  She does suffer from shortness of breath with any degree of exertion.  She denies any cough or wheeze and does not use her rescue inhaler frequently.  She is a lifelong non-smoker and has some mild coronary artery disease.  She works as a  and is lived in this area her whole life.  History obtained from: patient    Review of Systems   Constitutional: Negative.  " Negative for unexpected weight change.   HENT: Negative.  Negative for postnasal drip.    Eyes: Negative.    Respiratory:  Positive for shortness of breath. Negative for cough and wheezing.    Cardiovascular: Negative.  Negative for chest pain and leg swelling.   Gastrointestinal: Negative.    Endocrine: Negative.    Genitourinary: Negative.    Musculoskeletal: Negative.    Allergic/Immunologic: Negative.    Neurological: Negative.    Hematological: Negative.      Medical History Reviewed by provider this encounter:     .     Objective   /78 (BP Location: Left arm, Patient Position: Sitting, Cuff Size: Large)   Pulse 71   Temp (!) 97.4 °F (36.3 °C) (Tympanic)   Ht 5' 5\" (1.651 m)   Wt 85.3 kg (188 lb)   SpO2 97%   BMI 31.28 kg/m²      Physical Exam  Constitutional:       Appearance: She is well-developed.   HENT:      Head: Normocephalic and atraumatic.   Eyes:      Pupils: Pupils are equal, round, and reactive to light.   Cardiovascular:      Rate and Rhythm: Normal rate and regular rhythm.      Heart sounds: No murmur heard.  Pulmonary:      Effort: Pulmonary effort is normal. No respiratory distress.      Breath sounds: Normal breath sounds. No wheezing or rales.   Abdominal:      Palpations: Abdomen is soft.   Musculoskeletal:      Cervical back: Normal range of motion and neck supple.   Skin:     General: Skin is warm and dry.   Neurological:      Mental Status: She is alert and oriented to person, place, and time.         Administrative Statements   I have spent a total time of 40 minutes in caring for this patient on the day of the visit/encounter including Diagnostic results, Prognosis, Risks and benefits of tx options, Instructions for management, and Patient and family education.  "

## 2025-03-28 NOTE — PATIENT INSTRUCTIONS
Get blood work, echocardiogram, cxr and PFTs  Start Wixela one puff twice a day (rinse mouth after)  Continue to use Albuterol as needed

## 2025-03-28 NOTE — ASSESSMENT & PLAN NOTE
Yeimi have an FeNO today of 33 indicating some of her chest tightness and shortness of breath is due to uncontrolled asthma.  I started her on Wixela 1 puff twice a day and instructed her on proper inhaler use.  She can continue to use her albuterol as needed.  Her spirometry today was not normal either she had some mild obstructive disease but restriction suggested by office spirometry.  I like to get a chest x-ray and a BNP and an echocardiogram.  In addition we will get full pulmonary function test to better elicit her lung function.  She will return after this testing and being on Wixela for 1 month follow-up.

## 2025-03-31 ENCOUNTER — APPOINTMENT (OUTPATIENT)
Dept: RADIOLOGY | Facility: CLINIC | Age: 75
End: 2025-03-31
Payer: MEDICARE

## 2025-03-31 ENCOUNTER — LAB (OUTPATIENT)
Dept: LAB | Facility: CLINIC | Age: 75
End: 2025-03-31
Payer: MEDICARE

## 2025-03-31 DIAGNOSIS — J45.20 INTERMITTENT ASTHMA, UNSPECIFIED ASTHMA SEVERITY, UNSPECIFIED WHETHER COMPLICATED: ICD-10-CM

## 2025-03-31 LAB
BASOPHILS # BLD AUTO: 0.08 THOUSANDS/ÂΜL (ref 0–0.1)
BASOPHILS NFR BLD AUTO: 1 % (ref 0–1)
BNP SERPL-MCNC: 167 PG/ML (ref 0–100)
EOSINOPHIL # BLD AUTO: 0.32 THOUSAND/ÂΜL (ref 0–0.61)
EOSINOPHIL NFR BLD AUTO: 5 % (ref 0–6)
ERYTHROCYTE [DISTWIDTH] IN BLOOD BY AUTOMATED COUNT: 12.9 % (ref 11.6–15.1)
HCT VFR BLD AUTO: 37.7 % (ref 34.8–46.1)
HGB BLD-MCNC: 11.7 G/DL (ref 11.5–15.4)
IMM GRANULOCYTES # BLD AUTO: 0.01 THOUSAND/UL (ref 0–0.2)
IMM GRANULOCYTES NFR BLD AUTO: 0 % (ref 0–2)
LYMPHOCYTES # BLD AUTO: 1.83 THOUSANDS/ÂΜL (ref 0.6–4.47)
LYMPHOCYTES NFR BLD AUTO: 30 % (ref 14–44)
MCH RBC QN AUTO: 32.2 PG (ref 26.8–34.3)
MCHC RBC AUTO-ENTMCNC: 31 G/DL (ref 31.4–37.4)
MCV RBC AUTO: 104 FL (ref 82–98)
MONOCYTES # BLD AUTO: 0.52 THOUSAND/ÂΜL (ref 0.17–1.22)
MONOCYTES NFR BLD AUTO: 9 % (ref 4–12)
NEUTROPHILS # BLD AUTO: 3.39 THOUSANDS/ÂΜL (ref 1.85–7.62)
NEUTS SEG NFR BLD AUTO: 55 % (ref 43–75)
NRBC BLD AUTO-RTO: 0 /100 WBCS
PLATELET # BLD AUTO: 206 THOUSANDS/UL (ref 149–390)
PMV BLD AUTO: 11.1 FL (ref 8.9–12.7)
RBC # BLD AUTO: 3.63 MILLION/UL (ref 3.81–5.12)
WBC # BLD AUTO: 6.15 THOUSAND/UL (ref 4.31–10.16)

## 2025-03-31 PROCEDURE — 36415 COLL VENOUS BLD VENIPUNCTURE: CPT

## 2025-03-31 PROCEDURE — 71046 X-RAY EXAM CHEST 2 VIEWS: CPT

## 2025-03-31 PROCEDURE — 85025 COMPLETE CBC W/AUTO DIFF WBC: CPT

## 2025-03-31 PROCEDURE — 83880 ASSAY OF NATRIURETIC PEPTIDE: CPT

## 2025-04-08 ENCOUNTER — HOSPITAL ENCOUNTER (OUTPATIENT)
Dept: PULMONOLOGY | Facility: HOSPITAL | Age: 75
Discharge: HOME | End: 2025-04-08
Attending: INTERNAL MEDICINE
Payer: MEDICARE

## 2025-04-08 ENCOUNTER — HOSPITAL ENCOUNTER (OUTPATIENT)
Dept: NON INVASIVE DIAGNOSTICS | Facility: HOSPITAL | Age: 75
Discharge: HOME | End: 2025-04-08
Attending: INTERNAL MEDICINE
Payer: MEDICARE

## 2025-04-08 VITALS
WEIGHT: 188 LBS | HEIGHT: 65 IN | SYSTOLIC BLOOD PRESSURE: 122 MMHG | DIASTOLIC BLOOD PRESSURE: 78 MMHG | BODY MASS INDEX: 31.32 KG/M2 | HEART RATE: 64 BPM

## 2025-04-08 DIAGNOSIS — J45.20 INTERMITTENT ASTHMA, UNSPECIFIED ASTHMA SEVERITY, UNSPECIFIED WHETHER COMPLICATED: ICD-10-CM

## 2025-04-08 LAB
AORTIC ROOT: 2.9 CM
AORTIC VALVE MEAN VELOCITY: 9 M/S
ASCENDING AORTA: 3.3 CM
AV LVOT MEAN GRADIENT: 2 MMHG
AV LVOT PEAK GRADIENT: 3 MMHG
AV MEAN PRESS GRAD SYS DOP V1V2: 4 MMHG
AV PEAK GRADIENT: 8 MMHG
AV VELOCITY RATIO: 0.69
AV VMAX SYS DOP: 1.41 M/S
BSA FOR ECHO PROCEDURE: 1.93 M2
DOP CALC AO VTI: 30.6 CM
DOP CALC LVOT PEAK VEL VTI: 20.97 CM
DOP CALC LVOT PEAK VEL: 0.87 M/S
DOP CALC MV VTI: 25.69 CM
E WAVE DECELERATION TIME: 181 MS
E/A RATIO: 0.87
FRACTIONAL SHORTENING: 25 (ref 28–44)
INTERVENTRICULAR SEPTUM IN DIASTOLE (PARASTERNAL SHORT AXIS VIEW): 1.1 CM
INTERVENTRICULAR SEPTUM: 1.1 CM (ref 0.6–1.1)
LAAS-AP2: 18.5 CM2
LAAS-AP4: 17.4 CM2
LEFT ATRIUM SIZE: 3.7 CM
LEFT ATRIUM VOLUME (MOD BIPLANE): 48 ML
LEFT ATRIUM VOLUME INDEX (MOD BIPLANE): 24.9 ML/M2
LEFT INTERNAL DIMENSION IN SYSTOLE: 3.8 CM (ref 2.1–4)
LEFT VENTRICLE DIASTOLIC VOLUME (MOD BIPLANE): 114 ML
LEFT VENTRICLE DIASTOLIC VOLUME INDEX (MOD BIPLANE): 59.1 ML/M2
LEFT VENTRICLE SYSTOLIC VOLUME (MOD BIPLANE): 44 ML
LEFT VENTRICLE SYSTOLIC VOLUME INDEX (MOD BIPLANE): 22.8 ML/M2
LEFT VENTRICULAR INTERNAL DIMENSION IN DIASTOLE: 5.1 CM (ref 3.5–6)
LEFT VENTRICULAR POSTERIOR WALL IN END DIASTOLE: 1 CM
LEFT VENTRICULAR STROKE VOLUME: 62 ML
LV EF BIPLANE MOD: 61 %
LV EF US.2D.A4C+ESTIMATED: 65 %
LVSV (TEICH): 62 ML
MV E'TISSUE VEL-LAT: 10 CM/S
MV E'TISSUE VEL-SEP: 8 CM/S
MV MEAN GRADIENT: 1 MMHG
MV PEAK A VEL: 0.79 M/S
MV PEAK E VEL: 69 CM/S
MV PEAK GRADIENT: 3 MMHG
MV STENOSIS PRESSURE HALF TIME: 53 MS
MV VALVE AREA P 1/2 METHOD: 4.15
RIGHT ATRIAL 2D VOLUME: 49 ML
RIGHT ATRIUM AREA SYSTOLE A4C: 17.1 CM2
RIGHT VENTRICLE ID DIMENSION: 3.3 CM
SL CV LEFT ATRIUM LENGTH A2C: 5.3 CM
SL CV LV EF: 60
SL CV PED ECHO LEFT VENTRICLE DIASTOLIC VOLUME (MOD BIPLANE) 2D: 124 ML
SL CV PED ECHO LEFT VENTRICLE SYSTOLIC VOLUME (MOD BIPLANE) 2D: 62 ML
TR MAX PG: 19 MMHG
TR PEAK VELOCITY: 2.2 M/S
TRICUSPID ANNULAR PLANE SYSTOLIC EXCURSION: 2.2 CM
TRICUSPID VALVE PEAK REGURGITATION VELOCITY: 2.17 M/S

## 2025-04-08 PROCEDURE — 94726 PLETHYSMOGRAPHY LUNG VOLUMES: CPT | Performed by: INTERNAL MEDICINE

## 2025-04-08 PROCEDURE — 94729 DIFFUSING CAPACITY: CPT

## 2025-04-08 PROCEDURE — 94010 BREATHING CAPACITY TEST: CPT | Performed by: INTERNAL MEDICINE

## 2025-04-08 PROCEDURE — 94726 PLETHYSMOGRAPHY LUNG VOLUMES: CPT

## 2025-04-08 PROCEDURE — 93306 TTE W/DOPPLER COMPLETE: CPT

## 2025-04-08 PROCEDURE — 93306 TTE W/DOPPLER COMPLETE: CPT | Performed by: INTERNAL MEDICINE

## 2025-04-08 PROCEDURE — 94729 DIFFUSING CAPACITY: CPT | Performed by: INTERNAL MEDICINE

## 2025-04-08 PROCEDURE — 94760 N-INVAS EAR/PLS OXIMETRY 1: CPT

## 2025-04-08 PROCEDURE — 94010 BREATHING CAPACITY TEST: CPT

## 2025-04-29 ENCOUNTER — OFFICE VISIT (OUTPATIENT)
Age: 75
End: 2025-04-29
Payer: MEDICARE

## 2025-04-29 VITALS
DIASTOLIC BLOOD PRESSURE: 72 MMHG | SYSTOLIC BLOOD PRESSURE: 124 MMHG | HEIGHT: 65 IN | TEMPERATURE: 97.8 F | BODY MASS INDEX: 30.79 KG/M2 | WEIGHT: 184.8 LBS | HEART RATE: 72 BPM | OXYGEN SATURATION: 98 %

## 2025-04-29 DIAGNOSIS — J45.20 INTERMITTENT ASTHMA, UNSPECIFIED ASTHMA SEVERITY, UNSPECIFIED WHETHER COMPLICATED: Primary | ICD-10-CM

## 2025-04-29 DIAGNOSIS — J45.40 MODERATE PERSISTENT ASTHMA WITHOUT COMPLICATION: ICD-10-CM

## 2025-04-29 PROCEDURE — 99214 OFFICE O/P EST MOD 30 MIN: CPT | Performed by: INTERNAL MEDICINE

## 2025-04-29 PROCEDURE — 95012 NITRIC OXIDE EXP GAS DETER: CPT | Performed by: INTERNAL MEDICINE

## 2025-04-29 RX ORDER — ALBUTEROL SULFATE 90 UG/1
2 INHALANT RESPIRATORY (INHALATION) EVERY 6 HOURS PRN
Qty: 18 G | Refills: 3 | Status: SHIPPED | OUTPATIENT
Start: 2025-04-29

## 2025-04-29 RX ORDER — FLUTICASONE PROPIONATE AND SALMETEROL 250; 50 UG/1; UG/1
1 POWDER RESPIRATORY (INHALATION) 2 TIMES DAILY
Qty: 60 BLISTER | Refills: 5 | Status: SHIPPED | OUTPATIENT
Start: 2025-04-29

## 2025-04-29 NOTE — PROGRESS NOTES
Name: Demetria Leung      : 1950      MRN: [id number]  Encounter Provider: Maryellen Fay DO  Encounter Date: 2025   Encounter department: [facility]  :  Assessment & Plan  Intermittent asthma, unspecified asthma severity, unspecified whether complicated    Orders:    POCT FeNO    Fluticasone-Salmeterol (Wixela Inhub) 250-50 mcg/dose inhaler; Inhale 1 puff 2 (two) times a day Rinse mouth after use.    albuterol (PROVENTIL HFA,VENTOLIN HFA) 90 mcg/act inhaler; Inhale 2 puffs every 6 (six) hours as needed for shortness of breath    Moderate persistent asthma without complication  Yeimi seems to be doing better on Wixela.  Her FeNO slightly elevated still at 26 however higher dose inhalers seem to be cost prohibitive for her.  She is happy with the response and will maintain on Wixela 250 twice daily and use her rescue inhaler as needed.  I reviewed her echocardiogram which was essentially normal despite a slightly elevated BNP.  Her chest x-ray was also normal we will see her back in 4 months.             History of Present Illness   HPI  Demetria Leung is a 75 y.o. female who presents for follow-up of her adult onset asthma.  She has been breathing well slight improvement in her chest tightness and shortness of breath since starting the Advair.  She is her rescue inhaler infrequently.  History obtained from: patient    Review of Systems   Constitutional: Negative.  Negative for unexpected weight change.   HENT: Negative.  Negative for postnasal drip.    Eyes: Negative.    Respiratory: Negative.  Negative for cough, shortness of breath and wheezing.    Cardiovascular: Negative.  Negative for chest pain and leg swelling.   Gastrointestinal: Negative.    Endocrine: Negative.    Genitourinary: Negative.    Musculoskeletal: Negative.    Allergic/Immunologic: Negative.    Neurological: Negative.    Hematological: Negative.      Medical History Reviewed by provider this  "encounter:     .     Objective   /72 (BP Location: Left arm, Patient Position: Sitting, Cuff Size: Standard)   Pulse 72   Temp 97.8 °F (36.6 °C) (Tympanic)   Ht 5' 5\" (1.651 m)   Wt 83.8 kg (184 lb 12.8 oz)   SpO2 98%   BMI 30.75 kg/m²      Physical Exam  Constitutional:       Appearance: She is well-developed.   HENT:      Head: Normocephalic and atraumatic.   Eyes:      Pupils: Pupils are equal, round, and reactive to light.   Cardiovascular:      Rate and Rhythm: Normal rate and regular rhythm.      Heart sounds: No murmur heard.  Pulmonary:      Effort: Pulmonary effort is normal. No respiratory distress.      Breath sounds: Normal breath sounds. No wheezing or rales.   Abdominal:      Palpations: Abdomen is soft.   Musculoskeletal:      Cervical back: Normal range of motion and neck supple.   Skin:     General: Skin is warm and dry.   Neurological:      Mental Status: She is alert and oriented to person, place, and time.         Administrative Statements   I have spent a total time of 30 minutes in caring for this patient on the day of the visit/encounter including Diagnostic results, Prognosis, Risk factor reductions, Documenting in the medical record, and Reviewing/placing orders in the medical record (including tests, medications, and/or procedures).  "

## 2025-04-29 NOTE — ASSESSMENT & PLAN NOTE
Yeimi seems to be doing better on Wixela.  Her FeNO slightly elevated still at 26 however higher dose inhalers seem to be cost prohibitive for her.  She is happy with the response and will maintain on Wixela 250 twice daily and use her rescue inhaler as needed.  I reviewed her echocardiogram which was essentially normal despite a slightly elevated BNP.  Her chest x-ray was also normal we will see her back in 4 months.

## 2025-04-29 NOTE — ASSESSMENT & PLAN NOTE
Orders:    POCT FeNO    Fluticasone-Salmeterol (Wixela Inhub) 250-50 mcg/dose inhaler; Inhale 1 puff 2 (two) times a day Rinse mouth after use.    albuterol (PROVENTIL HFA,VENTOLIN HFA) 90 mcg/act inhaler; Inhale 2 puffs every 6 (six) hours as needed for shortness of breath

## 2025-06-23 ENCOUNTER — APPOINTMENT (OUTPATIENT)
Dept: LAB | Facility: CLINIC | Age: 75
End: 2025-06-23
Payer: MEDICARE

## 2025-06-23 DIAGNOSIS — I10 ESSENTIAL HYPERTENSION, MALIGNANT: ICD-10-CM

## 2025-06-23 DIAGNOSIS — R06.02 SHORTNESS OF BREATH: ICD-10-CM

## 2025-06-23 DIAGNOSIS — N18.31 CHRONIC KIDNEY DISEASE (CKD) STAGE G3A/A1, MODERATELY DECREASED GLOMERULAR FILTRATION RATE (GFR) BETWEEN 45-59 ML/MIN/1.73 SQUARE METER AND ALBUMINURIA CREATININE RATIO LESS THAN 30 MG/G (HCC): ICD-10-CM

## 2025-06-23 DIAGNOSIS — E78.5 HYPERLIPIDEMIA, UNSPECIFIED HYPERLIPIDEMIA TYPE: ICD-10-CM

## 2025-06-23 LAB
ALBUMIN SERPL BCG-MCNC: 3.9 G/DL (ref 3.5–5)
ALP SERPL-CCNC: 69 U/L (ref 34–104)
ALT SERPL W P-5'-P-CCNC: 13 U/L (ref 7–52)
ANION GAP SERPL CALCULATED.3IONS-SCNC: 9 MMOL/L (ref 4–13)
AST SERPL W P-5'-P-CCNC: 21 U/L (ref 13–39)
BILIRUB SERPL-MCNC: 1.1 MG/DL (ref 0.2–1)
BUN SERPL-MCNC: 21 MG/DL (ref 5–25)
CALCIUM SERPL-MCNC: 8.9 MG/DL (ref 8.4–10.2)
CHLORIDE SERPL-SCNC: 101 MMOL/L (ref 96–108)
CHOLEST SERPL-MCNC: 171 MG/DL (ref ?–200)
CO2 SERPL-SCNC: 25 MMOL/L (ref 21–32)
CREAT SERPL-MCNC: 1.15 MG/DL (ref 0.6–1.3)
GFR SERPL CREATININE-BSD FRML MDRD: 46 ML/MIN/1.73SQ M
GLUCOSE P FAST SERPL-MCNC: 87 MG/DL (ref 65–99)
HDLC SERPL-MCNC: 58 MG/DL
LDLC SERPL CALC-MCNC: 101 MG/DL (ref 0–100)
NONHDLC SERPL-MCNC: 113 MG/DL
POTASSIUM SERPL-SCNC: 4.3 MMOL/L (ref 3.5–5.3)
PROT SERPL-MCNC: 7.3 G/DL (ref 6.4–8.4)
SODIUM SERPL-SCNC: 135 MMOL/L (ref 135–147)
TRIGL SERPL-MCNC: 59 MG/DL (ref ?–150)

## 2025-06-23 PROCEDURE — 80061 LIPID PANEL: CPT

## 2025-06-23 PROCEDURE — 36415 COLL VENOUS BLD VENIPUNCTURE: CPT

## 2025-06-23 PROCEDURE — 80053 COMPREHEN METABOLIC PANEL: CPT

## 2025-07-22 DIAGNOSIS — E06.3 HYPOTHYROIDISM DUE TO HASHIMOTO'S THYROIDITIS: ICD-10-CM

## 2025-07-22 RX ORDER — LEVOTHYROXINE SODIUM 88 UG/1
88 TABLET ORAL DAILY
Qty: 30 TABLET | Refills: 0 | Status: SHIPPED | OUTPATIENT
Start: 2025-07-22

## 2025-08-14 ENCOUNTER — HOSPITAL ENCOUNTER (OUTPATIENT)
Dept: MAMMOGRAPHY | Facility: CLINIC | Age: 75
Discharge: HOME | End: 2025-08-14
Payer: MEDICARE

## 2025-08-14 ENCOUNTER — APPOINTMENT (OUTPATIENT)
Dept: LAB | Facility: CLINIC | Age: 75
End: 2025-08-14
Payer: MEDICARE

## (undated) DEVICE — SKIN MARKER DUAL TIP WITH RULER CAP, FLEXIBLE RULER AND LABELS: Brand: DEVON

## (undated) DEVICE — ELECTRODE NEEDLE MEGAFINE 2IN E-Z CLEAN MEGADYNE -0118

## (undated) DEVICE — TIBURON SPLIT SHEET: Brand: CONVERTORS

## (undated) DEVICE — INTENDED FOR TISSUE SEPARATION, AND OTHER PROCEDURES THAT REQUIRE A SHARP SURGICAL BLADE TO PUNCTURE OR CUT.: Brand: BARD-PARKER ® CARBON RIB-BACK BLADES

## (undated) DEVICE — BETHLEHEM UNIVERSAL OUTPATIENT: Brand: CARDINAL HEALTH

## (undated) DEVICE — NEEDLE 25G X 1 1/2

## (undated) DEVICE — NEPTUNE E-SEP SMOKE EVACUATION PENCIL, COATED, 70MM BLADE, PUSH BUTTON SWITCH: Brand: NEPTUNE E-SEP

## (undated) DEVICE — 10FR FRAZIER SUCTION HANDLE: Brand: CARDINAL HEALTH

## (undated) DEVICE — SUT VICRYL 5-0 PS-3 18 IN J500G

## (undated) DEVICE — VESSEL CANNULA

## (undated) DEVICE — DISPOSABLE OR TOWEL: Brand: CARDINAL HEALTH

## (undated) DEVICE — TUBING SUCTION 5MM X 12 FT

## (undated) DEVICE — GLOVE SRG BIOGEL 7

## (undated) DEVICE — POV-IOD SOLUTION 4OZ BT

## (undated) DEVICE — SUT CHROMIC 5-0 P-3 18 IN 687G